# Patient Record
Sex: FEMALE | Race: WHITE
[De-identification: names, ages, dates, MRNs, and addresses within clinical notes are randomized per-mention and may not be internally consistent; named-entity substitution may affect disease eponyms.]

---

## 2017-05-29 ENCOUNTER — HOSPITAL ENCOUNTER (EMERGENCY)
Dept: HOSPITAL 45 - C.EDB | Age: 47
Discharge: HOME | End: 2017-05-29
Payer: COMMERCIAL

## 2017-05-29 VITALS — SYSTOLIC BLOOD PRESSURE: 131 MMHG | DIASTOLIC BLOOD PRESSURE: 70 MMHG | OXYGEN SATURATION: 96 % | HEART RATE: 91 BPM

## 2017-05-29 VITALS
WEIGHT: 207.9 LBS | HEIGHT: 67.99 IN | HEIGHT: 67.99 IN | BODY MASS INDEX: 31.51 KG/M2 | BODY MASS INDEX: 31.51 KG/M2 | WEIGHT: 207.9 LBS

## 2017-05-29 VITALS — TEMPERATURE: 98.06 F

## 2017-05-29 DIAGNOSIS — G89.4: ICD-10-CM

## 2017-05-29 DIAGNOSIS — K08.89: Primary | ICD-10-CM

## 2017-05-29 DIAGNOSIS — Z84.1: ICD-10-CM

## 2017-05-29 DIAGNOSIS — E78.5: ICD-10-CM

## 2017-05-29 DIAGNOSIS — Z82.49: ICD-10-CM

## 2017-05-29 DIAGNOSIS — F17.200: ICD-10-CM

## 2017-05-29 DIAGNOSIS — J33.9: ICD-10-CM

## 2017-05-29 DIAGNOSIS — Z98.51: ICD-10-CM

## 2017-05-29 DIAGNOSIS — K02.9: ICD-10-CM

## 2017-05-29 DIAGNOSIS — Z83.3: ICD-10-CM

## 2017-05-29 DIAGNOSIS — E11.9: ICD-10-CM

## 2017-05-29 NOTE — EMERGENCY ROOM VISIT NOTE
History


First contact with patient:  15:01


Chief Complaint:  HEADACHE


Stated Complaint:  SEVERE HEADACHE/JAW/EAR PAIN





History of Present Illness


The patient is a 47 year old female who presents to the Emergency Room with 

complaints of jaw pain intermittently over the past 6 months.  The patient 

states that she has had intermittent dental pain for the past 6 months.  She 

states that her teeth have been falling out.  She has seen a dentist and does 

have an appointment this week for a consultation to have her teeth extracted.  

The patient states that she has pain throughout her jaw which also gives her a 

headache and left ear pain.  She states she was seen a few weeks ago at a walk-

in clinic and referred here, but at that time she was in too much pain to come 

here.  She states the pain makes it difficult for her to eat.  She rates the 

discomfort a 7/10.  She has been taking Tylenol and ibuprofen without relief.  

She is a smoker.  She denies any fevers, chest pain, shortness of breath or 

neck pain/stiffness.





Review of Systems


A complete 10 point review of systems was reviewed with the patient with 

pertinent positives and negatives as per history of present illness. All else 

were negative.





Past Medical/Surgical History


Medical Problems:


(1) Chronic pain syndrome


(2) Diabetes mellitus type 2


(3) dyslipidemia


(4) endometrial cryoablation


(5) History of -  section


(6) History of - tubal ligation


(7) History of cholecystectomy


(8) Nasal polyp


(9) Sepsis


(10) Ulcer


Surgical Problems:


(1) Hx of cholecystectomy








Family History





Cancer


Diabetes mellitus


Gallbladder disease


Heart disease


Hypertension


Kidney stones





Social History


Smoking Status:  Current Every Day Smoker


Alcohol Use:  none


Marital Status:  single


Housing Status:  unknown


Occupation Status:  other





Current/Historical Medications


Scheduled


Amoxicillin (Amoxil), 500 MG PO BID


Gabapentin (Gabapentin), 600 MG PO TID


Methadone Hcl (Methadone Hcl), 144 MG PO QAM


Omeprazole (Prilosec), 20 MG PO BID


Venlafaxine Hcl (Effexor Extended Rel), 75 MG PO DAILY


Venlafaxine Hcl (Effexor Extended Rel), 150 MG PO DAILY





Scheduled PRN


Acetaminophen/Codeine (Tylenol W/Codeine #3), 1-2 TABS PO Q4H PRN for Pain


Clonazepam (Klonopin), 1 MG PO DAILY PRN for Anxiety


Docusate Sodium (Docusate Sodium), 100 MG PO BID PRN for Constipation





Allergies


Coded Allergies:  


     Tramadol (Unverified  Allergy, Unknown, SEIZURE, 16)


     Promethazine (Verified  Adverse Reaction, Mild, JITTERY, 16)


 PER PATIENT, GETS JITTERY. NOT ALLERGY





Physical Exam


Vital Signs











  Date Time  Temp Pulse Resp B/P Pulse Ox O2 Delivery O2 Flow Rate FiO2


 


17 16:15  91 18 131/70 96   


 


17 14:56 36.7 123 18 145/77 94 Room Air  











Physical Exam


VITALS: Vitals are noted on the nurse's note and reviewed by myself.  Vital 

signs stable.


GENERAL: This is a 47-year-old female, in no acute distress, nondiaphoretic, 

well-developed well-nourished.


SKIN: The skin was without rashes.


EARS: External auditory canals clear, tympanic membranes pearly gray without 

erythema or effusion bilaterally.


EYES: Pupils equal round and reactive to light and accommodation.  


NOSE: Patent, turbinates without inflammation or discharge.  No sinus 

tenderness.


MOUTH: Mucous membranes moist.  The teeth are very carious.  There is no 

obvious swelling or erythema of the gums.  There is no discharge or any sign of 

an abscess.


NECK: Supple without nuchal rigidity.  No lymphadenopathy. 


HEART: Regular rate and rhythm without murmurs gallops or rubs.


LUNGS: Clear to auscultation bilaterally without wheezes, rales or rhonchi.  


NEURO: Patient was alert and oriented to person place and time.





Medical Decision & Procedures


Medications Administered











 Medications


  (Trade)  Dose


 Ordered  Sig/Anibal


 Route  Start Time


 Stop Time Status Last Admin


Dose Admin


 


 Ketorolac


 Tromethamine


  (Toradol Inj)  60 mg  NOW  STAT


 IM  17 15:26


 17 15:28 DC 17 15:34


60 MG


 


 Acetaminophen/


 Codeine Phosphate


  (TYLENOL W/


 CODEINE #3 Home


 Pack)  1 homepack  UD  ONCE


 PO  17 15:30


 17 15:31 DC 17 15:34


1 HOMEPACK











Medical Decision


Differential diagnosis includes dental pain, dental infection, TMJ disorder, 

among others.





The patient was evaluated as above.  Her pain appears to be dental in nature.  

She does not appear to have an infection.  The patient was treated with an 

injection of Toradol here.  She will be given a short course of Tylenol with 

codeine, but she was instructed to follow-up with her dentist for any further 

treatment of her chronic dental pain.  This pain has been ongoing for 6 months 

and she was informed that the emergency department is not able to treat her 

chronic pain.  She verbalized understanding of my assessment and treatment plan 

and was discharged home in good condition.





Impression





 Primary Impression:  


 Chronic dental pain





Departure Information


Dispostion


Home / Self-Care





Condition


GOOD





Prescriptions





Acetaminophen/Codeine (Tylenol W/Codeine #3) 300 Mg/30 Mg Tab


1-2 TABS PO Q4H Y for Pain, #15 TAB


   For Initial Treatment


   Prov: Aye Jasmine ., MYRANDA         17





Referrals


No Doctor, Assigned (PCP)





Patient Instructions


My [facility]





Additional Instructions





You have been prescribed Tylenol with Codeine to be used for pain control.  

Take 1-2 tablets every 4-6 hours as needed for pain.   This is a narcotic 

medication. You cannot drive or consume alcohol while on this medicine. This 

medicine should only be used for pain that cannot be controlled with over-the-

counter pain medicines.





For pain control, you can use the following over-the-counter medicines (if >13 yo):





- Regular strength (325mg/tab) Tylenol (acetaminophen) 2 tabs every 4-6 hours 

as needed. Do not exceed 12 tablets in a 24 hour period. Avoid taking more than 

4 grams (4000 mg) of Tylenol per day. This includes any other sources of 

acetaminophen you may take on a regular basis.





- Regular strength (200 mg/tab) Advil (ibuprofen) 1-2 tabs every 4-6 hours as 

needed. Do not exceed a dose of 3200 mg per day.





Follow-up with your dentist as scheduled.





Return to the emergency department with high fevers, neck swelling or any other 

new/concerning symptoms.

## 2017-06-26 ENCOUNTER — HOSPITAL ENCOUNTER (EMERGENCY)
Dept: HOSPITAL 45 - C.EDB | Age: 47
LOS: 1 days | Discharge: HOME | End: 2017-06-27
Payer: COMMERCIAL

## 2017-06-26 VITALS
BODY MASS INDEX: 31.71 KG/M2 | HEIGHT: 67.99 IN | BODY MASS INDEX: 31.71 KG/M2 | HEIGHT: 67.99 IN | WEIGHT: 209.22 LBS | WEIGHT: 209.22 LBS

## 2017-06-26 VITALS — TEMPERATURE: 98.42 F

## 2017-06-26 DIAGNOSIS — F17.200: ICD-10-CM

## 2017-06-26 DIAGNOSIS — G89.4: ICD-10-CM

## 2017-06-26 DIAGNOSIS — Z83.3: ICD-10-CM

## 2017-06-26 DIAGNOSIS — Z98.51: ICD-10-CM

## 2017-06-26 DIAGNOSIS — E11.9: ICD-10-CM

## 2017-06-26 DIAGNOSIS — J18.9: ICD-10-CM

## 2017-06-26 DIAGNOSIS — Z82.49: ICD-10-CM

## 2017-06-26 DIAGNOSIS — R07.89: Primary | ICD-10-CM

## 2017-06-26 DIAGNOSIS — E78.5: ICD-10-CM

## 2017-06-26 DIAGNOSIS — Z84.1: ICD-10-CM

## 2017-06-26 LAB
ALBUMIN/GLOB SERPL: 0.9 {RATIO} (ref 0.9–2)
ALP SERPL-CCNC: 180 U/L (ref 45–117)
ALT SERPL-CCNC: 64 U/L (ref 12–78)
ANION GAP SERPL CALC-SCNC: 6 MMOL/L (ref 3–11)
APPEARANCE UR: CLEAR
AST SERPL-CCNC: 38 U/L (ref 15–37)
BASOPHILS # BLD: 0.05 K/UL (ref 0–0.2)
BASOPHILS NFR BLD: 0.5 %
BILIRUB UR-MCNC: (no result) MG/DL
BUN SERPL-MCNC: 10 MG/DL (ref 7–18)
BUN/CREAT SERPL: 10.8 (ref 10–20)
CALCIUM SERPL-MCNC: 9.4 MG/DL (ref 8.5–10.1)
CHLORIDE SERPL-SCNC: 104 MMOL/L (ref 98–107)
CO2 SERPL-SCNC: 27 MMOL/L (ref 21–32)
COLOR UR: YELLOW
COMPLETE: YES
CREAT CL PREDICTED SERPL C-G-VRATE: 92 ML/MIN
CREAT SERPL-MCNC: 0.92 MG/DL (ref 0.6–1.2)
EOSINOPHIL NFR BLD AUTO: 226 K/UL (ref 130–400)
GLOBULIN SER-MCNC: 4.1 GM/DL (ref 2.5–4)
GLUCOSE SERPL-MCNC: 81 MG/DL (ref 70–99)
HCT VFR BLD CALC: 39.4 % (ref 37–47)
IG%: 0.7 %
IMM GRANULOCYTES NFR BLD AUTO: 29.3 %
LYMPHOCYTES # BLD: 3.13 K/UL (ref 1.2–3.4)
MANUAL MICROSCOPIC REQUIRED?: NO
MCH RBC QN AUTO: 29.9 PG (ref 25–34)
MCHC RBC AUTO-ENTMCNC: 32.5 G/DL (ref 32–36)
MCV RBC AUTO: 92.1 FL (ref 80–100)
MONOCYTES NFR BLD: 5.1 %
NEUTROPHILS # BLD AUTO: 5.1 %
NEUTROPHILS NFR BLD AUTO: 59.3 %
NITRITE UR QL STRIP: (no result)
PH UR STRIP: 5 [PH] (ref 4.5–7.5)
PMV BLD AUTO: 9.5 FL (ref 7.4–10.4)
POTASSIUM SERPL-SCNC: 3.8 MMOL/L (ref 3.5–5.1)
RBC # BLD AUTO: 4.28 M/UL (ref 4.2–5.4)
REVIEW REQ?: NO
SODIUM SERPL-SCNC: 137 MMOL/L (ref 136–145)
SP GR UR STRIP: 1.02 (ref 1–1.03)
URINE BILL WITH OR WITHOUT MIC: (no result)
UROBILINOGEN UR-MCNC: (no result) MG/DL
WBC # BLD AUTO: 10.68 K/UL (ref 4.8–10.8)
ZZUR CULT IF INDIC CLEAN CATCH: NO

## 2017-06-26 NOTE — EMERGENCY ROOM VISIT NOTE
History


Report prepared by Lida:  Gwendolyn Gardner


Under the Supervision of:  LOVE BeckO.


First contact with patient:  18:02


Chief Complaint:  RIB PAIN


Stated Complaint:  R FRONT AND BACK RIB PAIN,FEVER,SOB





History of Present Illness


The patient is a 47 year old female who presents to the Emergency Room with 

complaints of persistent, worsening right sided rib pain to the anterior and 

posterior aspects that began several weeks ago.  She currently rates her 

discomfort as a 6/10 in severity.  The patient states that she developed the 

pain a few weeks ago, but states that the pain has worsened.  She states that 

she has difficulty holding her grandson due to the pain.  The patient describes 

the pain as a pulsating pain and additionally associates shortness of breath.  

She reports a history of pneumonia and states that she is an every day smoker.  

The patient states that she is scheduled for mouth surgery this month for an 

extensive infection.  She reports a fever of 101-102 degrees Fahrenheit over 

the past several days.  The patient states that her symptoms feel worse than 

her pneumonia.  She additionally notes multiple sick contacts.  The patient 

denies any change in bowel movements or urinary symptoms.  She denies any 

recent fall, trauma, or injury.  The patient denies any recent travel.  She 

denies any history of hypertension, kidney disease, or heart disease.





   Source of History:  patient


   Onset:  several weeks ago


   Position:  other (right sided rib)


   Symptom Intensity:  6/10


   Quality:  other (pulsating)


   Timing:  worsening, other (persistent)


   Associated Symptoms:  + fevers, + SOB, No urinary symptoms





Review of Systems


See HPI for pertinent positives & negatives. A total of 10 systems reviewed and 

were otherwise negative.





Past Medical & Surgical


Medical Problems:


(1) Chronic pain syndrome


(2) Diabetes mellitus type 2


(3) dyslipidemia


(4) endometrial cryoablation


(5) History of -  section


(6) History of - tubal ligation


(7) History of cholecystectomy


(8) Nasal polyp


(9) Sepsis


(10) Ulcer


Surgical Problems:


(1) Hx of cholecystectomy








Family History





Cancer


Diabetes mellitus


Gallbladder disease


Heart disease


Hypertension


Kidney stones





Social History


Smoking Status:  Current Every Day Smoker


Alcohol Use:  none


Marital Status:  single


Housing Status:  unknown


Occupation Status:  other





Current/Historical Medications


Scheduled


Docusate Sodium (Docusate Sodium), 100 MG PO DAILY


Gabapentin (Gabapentin), 600 MG PO TID


Levofloxacin (Levaquin), 750 MG PO DAILY


Methadone Hcl (Methadone Hcl), 144 MG PO QAM


Omeprazole (Prilosec), 20 MG PO BID


Prednisone (Prednisone Tab), 3 TAB PO DAILY


Venlafaxine Hcl (Effexor Extended Rel), 75 MG PO DAILY


Venlafaxine Hcl (Effexor Extended Rel), 150 MG PO DAILY





Scheduled PRN


Clonazepam (Klonopin), 1 MG PO DAILY PRN for Anxiety





Allergies


Coded Allergies:  


     Tramadol (Unverified  Allergy, Unknown, SEIZURE, 16)


     Promethazine (Verified  Adverse Reaction, Mild, JITTERY, 16)


 PER PATIENT, GETS JITTERY. NOT ALLERGY





Physical Exam


Vital Signs











  Date Time  Temp Pulse Resp B/P (MAP) Pulse Ox O2 Delivery O2 Flow Rate FiO2


 


17 01:07  79 18 109/65 97 Room Air  


 


17 01:06  79 20  97 Room Air  


 


17 00:13  78 18 94/62 93 Room Air  


 


17 22:49  81 20 108/55 93 Room Air  


 


17 20:58  73 20 121/58 98 Room Air  


 


17 19:39  84 20 133/70 94 Room Air  


 


17 17:59 36.9 93 16 135/82 95 Room Air  











Physical Exam


GENERAL: alert, well appearing, well nourished, no distress, non-toxic 


EYE EXAM: normal conjunctiva, PERRL and EOM's grossly intact


OROPHARYNX: no exudate, no erythema, lips, buccal mucosa, and tongue normal and 

mucous membranes are dry, poor dentition.


NECK: supple, no nuchal rigidity, no adenopathy, non-tender


LUNGS: Decreased breath sounds at the right base posteriorly, slight crackle, 

no wheezes, rhonchi, or rales. Normal chest wall mechanics


HEART: no murmurs, S1 normal and S2 normal 


ABDOMEN: abdomen soft, non-tender, normo-active bowel sounds, no masses, no 

rebound or guarding. 


BACK: Back is symmetrical on inspection and there is no deformity, no midline 

tenderness, no CVA tenderness. 


SKIN: no rashes and no bruising 


UPPER EXTREMITIES: upper extremities are grossly normal. 


LOWER EXTREMITIES: No pitting edema.


NEURO EXAM: Normal sensorium, cranial nerves II-XII [grossly] intact, normal 

speech,  no [gross] weakness of arms, no [gross] weakness of legs. [No drift. 

Finger to nose intact. Gross sensation intact.]





Medical Decision & Procedures


ER Provider


Diagnostic Interpretation:


Radiology results have been interpreted by the radiologist and reviewed by me.





CHEST 2 VIEWS ROUTINE





HISTORY:      Short of breath. Cough.





COMPARISON: Chest 2016.





FINDINGS: The lungs are clear. Cardiac silhouette is normal in size. No pleural


effusions. No pneumothorax. Cholecystectomy. Old distal left clavicle fracture.





IMPRESSION:


No acute process.





Electronically signed by:  Dandy Chiang M.D.


2017 7:25 PM





Dictated Date/Time:  2017 7:23 PM





ABDOMINAL ULTRASOUND, RIGHT UPPER QUADRANT





HISTORY:      Right upper quadrant abdominal pain..





COMPARISON:  Abdomen and pelvis CT 2015.





FINDINGS:





Pancreas: The head of the pancreas appears unremarkable. The majority of the


body and tail are obscured by overlying bowel gas.





Liver: The liver is echogenic consistent with fatty change. Mild intrahepatic


bile duct dilatation, unchanged.





Gallbladder: The gallbladder is surgically absent.





CBD: 1.4 cm in diameter. This remains unchanged.





Right kidney: No hydronephrosis.





IMPRESSION: 





1. Intra and extra hepatic bile duct dilatation, unchanged. This may be due to


the patient's postcholecystectomy state.


2. Hepatic steatosis.





Electronically signed by:  Dandy Chiang M.D.


2017 9:37 PM





Dictated Date/Time:  2017 9:34 PM





CTA CHEST: 


No central pulmonary embolus.





Patchy ground-glass opacities bilaterally that may be from edema, pneumonia, 

hemorrhage or allergic reaction.  There is a broader differential.





Mediastinal and hilar adenopathy.


Generous size liver and spleen.  Slightly nodular liver.


Cholecystectomy and biliary ectasia.





Radiologist: Manal Schoellerman MD         Phone: [phone]





Study ready at 6861 and initial results transmitted at 4171





Laboratory Results


17 18:30








Red Blood Count 4.28, Mean Corpuscular Volume 92.1, Mean Corpuscular Hemoglobin 

29.9, Mean Corpuscular Hemoglobin Concent 32.5, Mean Platelet Volume 9.5, 

Neutrophils (%) (Auto) 59.3, Lymphocytes (%) (Auto) 29.3, Monocytes (%) (Auto) 

5.1, Eosinophils (%) (Auto) 5.1, Basophils (%) (Auto) 0.5, Neutrophils # (Auto) 

6.35, Lymphocytes # (Auto) 3.13, Monocytes # (Auto) 0.54, Eosinophils # (Auto) 

0.54, Basophils # (Auto) 0.05





17 18:30

















Test


  17


18:30 17


21:10


 


White Blood Count


  10.68 K/uL


(4.8-10.8) 


 


 


Red Blood Count


  4.28 M/uL


(4.2-5.4) 


 


 


Hemoglobin


  12.8 g/dL


(12.0-16.0) 


 


 


Hematocrit 39.4 % (37-47)  


 


Mean Corpuscular Volume


  92.1 fL


() 


 


 


Mean Corpuscular Hemoglobin


  29.9 pg


(25-34) 


 


 


Mean Corpuscular Hemoglobin


Concent 32.5 g/dl


(32-36) 


 


 


Platelet Count


  226 K/uL


(130-400) 


 


 


Mean Platelet Volume


  9.5 fL


(7.4-10.4) 


 


 


Neutrophils (%) (Auto) 59.3 %  


 


Lymphocytes (%) (Auto) 29.3 %  


 


Monocytes (%) (Auto) 5.1 %  


 


Eosinophils (%) (Auto) 5.1 %  


 


Basophils (%) (Auto) 0.5 %  


 


Neutrophils # (Auto)


  6.35 K/uL


(1.4-6.5) 


 


 


Lymphocytes # (Auto)


  3.13 K/uL


(1.2-3.4) 


 


 


Monocytes # (Auto)


  0.54 K/uL


(0.11-0.59) 


 


 


Eosinophils # (Auto)


  0.54 K/uL


(0-0.5) 


 


 


Basophils # (Auto)


  0.05 K/uL


(0-0.2) 


 


 


RDW Standard Deviation


  49.8 fL


(36.4-46.3) 


 


 


RDW Coefficient of Variation


  14.9 %


(11.5-14.5) 


 


 


Immature Granulocyte % (Auto) 0.7 %  


 


Immature Granulocyte # (Auto)


  0.07 K/uL


(0.00-0.02) 


 


 


D-Dimer


  200 ug/L FEU


(0-500) 


 


 


Anion Gap


  6.0 mmol/L


(3-11) 


 


 


Est Creatinine Clear Calc


Drug Dose 92.0 ml/min 


  


 


 


Estimated GFR (


American) 86.5 


  


 


 


Estimated GFR (Non-


American 74.7 


  


 


 


BUN/Creatinine Ratio 10.8 (10-20)  


 


Calcium Level


  9.4 mg/dl


(8.5-10.1) 


 


 


Total Bilirubin


  0.2 mg/dl


(0.2-1) 


 


 


Aspartate Amino Transf


(AST/SGOT) 38 U/L (15-37) 


  


 


 


Alanine Aminotransferase


(ALT/SGPT) 64 U/L (12-78) 


  


 


 


Alkaline Phosphatase


  180 U/L


() 


 


 


Troponin I


  < 0.015 ng/ml


(0-0.045) 


 


 


Total Protein


  7.9 gm/dl


(6.4-8.2) 


 


 


Albumin


  3.8 gm/dl


(3.4-5.0) 


 


 


Globulin


  4.1 gm/dl


(2.5-4.0) 


 


 


Albumin/Globulin Ratio 0.9 (0.9-2)  


 


Urine Color  YELLOW 


 


Urine Appearance  CLEAR (CLEAR) 


 


Urine pH  5.0 (4.5-7.5) 


 


Urine Specific Gravity


  


  1.016


(1.000-1.030)


 


Urine Protein  NEG (NEG) 


 


Urine Glucose (UA)  NEG (NEG) 


 


Urine Ketones  NEG (NEG) 


 


Urine Occult Blood  NEG (NEG) 


 


Urine Nitrite  NEG (NEG) 


 


Urine Bilirubin  NEG (NEG) 


 


Urine Urobilinogen  NEG (NEG) 


 


Urine Leukocyte Esterase  NEG (NEG) 








Laboratory results per my review.





Medications Administered











 Medications


  (Trade)  Dose


 Ordered  Sig/Anibal


 Route  Start Time


 Stop Time Status Last Admin


Dose Admin


 


 Albuterol/


 Ipratropium


  (Duoneb)  3 ml  NOW  STAT


 INH  17 18:39


 17 18:40 DC 17 18:39


3 ML


 


 Ketorolac


 Tromethamine


  (Toradol Inj)  30 mg  NOW  STAT


 IV  17 19:55


 17 19:56 DC 17 20:04


30 MG


 


 Albuterol/


 Ipratropium


  (Duoneb)  3 ml  NOW  STAT


 INH  17 21:31


 17 21:32 DC 17 21:31


3 ML


 


 Al Hydroxide/Mg


 Hydroxide


  (Maalox Susp)  30 ml  NOW  STAT


 PO  17 22:28


 17 22:30 DC 17 22:28


30 ML


 


 Pantoprazole


 Sodium 40 mg/


 Syringe  10 ml @ 5


 mls/min  NOW  ONCE


 IV  17 22:30


 17 22:31 DC 17 22:48


5 MLS/MIN


 


 Dexamethasone


 Sodium Phosphate


  (Decadron Inj)  10 mg  NOW  ONCE


 IV  17 23:45


 17 23:47 DC 17 00:09


10 MG


 


 Levofloxacin


  (Levaquin Tab)  750 mg  NOW  STAT


 PO  17 23:45


 17 23:47 DC 17 00:08


750 MG


 


 Albuterol


  (Ventolin Hfa


 Inhaler)  2 puffs  NOW  ONCE


 INH  17 00:30


 17 00:31 DC 17 00:42


2 PUFFS


 


 Sodium Chloride  1,000 ml @ 


 999 mls/hr  Q1H1M STAT


 IV  17 00:32


 17 01:32 DC 17 00:41


999 MLS/HR











ECG


Indication:  SOB/dyspnea


Rate (beats per minute):  71


Rhythm:  sinus rhythm


Findings:  no acute ischemic change, other (normal axis, normal intervals)





ED Course


: The patient was evaluated in room A3. A complete history and physical 

exam was performed. 





: Ordered Duoneb 3 ml INH.





: Ordered Toradol Inj 30 mg IV.





: Ordered Duoneb 3 ml INH.





2225: I reevaluated the patient and she is still having pain. She states that 

her breathing is better, but still notes pain.





8: Ordered Maalox Susp 30 ml PO.





2230: Ordered Pantoprazole Sodium 40 mg/Syringe 10 ml @ 5 mls/min IV.





2345: Ordered Levofloxacin 750 mg PO, Decadron Inj 10 mg IV.





0010: I reevaluated the patient and she is resting.  I updated her on her 

results at this time.





Medical Decision


Differential diagnoses includes but is not limited to pneumonia, bronchitis, 

COPD/Asthma exacerbation, pneumothorax, pulmonary embolism, congestive heart 

failure, acute coronary syndrome





Medication Reconciliation: I attest that I have personally reviewed the patient'

s current medication list.





Blood pressure screening: Patient was found to have normal blood pressure on 

screening and does not require follow-up.





Patient well-appearing despite complaints, no hypoxia, no increased work of 

breathing, her main complaint was the pain along the right anterolateral ribs.  

Labs are reassuring, however given persistent pain CT of the chest was pursued.

  This revealed likely pneumonia.  Patient does have a history of prior 

pneumonia, does continue to smoke, and has had recent sick contacts.  Given the 

cough, chest pain, fevers, and risk factors for pulmonary infection, I feel 

pneumonia is most likely diagnosis.  Patient started on antibiotics and 

steroids here, was given an MDI and spacer and did improve.  Patient ambulated 

without any significant change in her pulse ox or significant increased work of 

breathing.   was helpful in arranging outpatient follow-up this 

patient has no PCP.  Discussed with patient symptoms to watch and return for, 

she verbalized understanding was agreeable with plan.  Doubt cardiac etiology, 

PE, concurrent vascular etiology.  Patient encouraged to quit smoking.  No 

evidence of bacteremia/sepsis, patient's IV did infiltrate and she refused to 

have another one established, however she was tolerating by mouth fluids.  One 

blood pressure reading was mildly low, however all of these around it were 

within normal limits, I feel this one pressure reading was errant and likely 

not accurate.





Impression





 Primary Impression:  


 Chest pain


 Additional Impressions:  


 Pneumonia


 Tobacco abuse





Scribe Attestation


The scribe's documentation has been prepared under my direction and personally 

reviewed by me in its entirety. I confirm that the note above accurately 

reflects all work, treatment, procedures, and medical decision making performed 

by me.





Departure Information


Dispostion


Home / Self-Care





Prescriptions





Prednisone (Prednisone Tab) 20 Mg Tab


3 TAB PO DAILY, #12 TAB


   FOR 4 DAYS


   Prov: Marlene Green,          17 


Levofloxacin (Levaquin) 500 Mg Tab


750 MG PO DAILY for 5 Days, #8 TAB


   Prov: Marlene Green, DO         17





Referrals


No Doctor, Assigned (PCP)





Patient Instructions


My [facility]





Additional Instructions





Please call and follow up as scheduled with the  with family doctor 

as an outpatient.  Please take the antibiotics and steroids as prescribed.  You 

may use the inhaler plus spacer up to every 4 hours as needed for frequent 

coughing, shortness of breath, or wheezing.  You may use Tylenol and ibuprofen 

as needed for pain.  If you have any worsening pain, develop increased trouble 

breathing, or coughing up blood, fevers, dizziness, vomiting, or you've any 

other new concerns, please return the emergency room.





Problem Qualifiers








 Primary Impression:  


 Chest pain


 Chest pain type:  other chest pain  Qualified Codes:  R07.89 - Other chest pain


 Additional Impressions:  


 Pneumonia


 Pneumonia type:  due to unspecified organism  Laterality:  bilateral  Lung 

location:  lower lobe of lung  Qualified Codes:  J18.9 - Pneumonia, unspecified 

organism

## 2017-06-26 NOTE — DIAGNOSTIC IMAGING REPORT
CHEST 2 VIEWS ROUTINE



HISTORY:      Short of breath. Cough.



COMPARISON: Chest 12/12/2016.



FINDINGS: The lungs are clear. Cardiac silhouette is normal in size. No pleural

effusions. No pneumothorax. Cholecystectomy. Old distal left clavicle fracture.



IMPRESSION:

No acute process.







Electronically signed by:  Dandy Chiang M.D.

6/26/2017 7:25 PM



Dictated Date/Time:  6/26/2017 7:23 PM

## 2017-06-26 NOTE — DIAGNOSTIC IMAGING REPORT
ABDOMINAL ULTRASOUND, RIGHT UPPER QUADRANT



HISTORY:      Right upper quadrant abdominal pain..



COMPARISON:  Abdomen and pelvis CT 9/2/2015.



FINDINGS:



Pancreas: The head of the pancreas appears unremarkable. The majority of the

body and tail are obscured by overlying bowel gas.



Liver: The liver is echogenic consistent with fatty change. Mild intrahepatic

bile duct dilatation, unchanged.



Gallbladder: The gallbladder is surgically absent.



CBD: 1.4 cm in diameter. This remains unchanged.



Right kidney: No hydronephrosis.



IMPRESSION: 



1. Intra and extra hepatic bile duct dilatation, unchanged. This may be due to

the patient's postcholecystectomy state.

2. Hepatic steatosis.







Electronically signed by:  Dandy Chiang M.D.

6/26/2017 9:37 PM



Dictated Date/Time:  6/26/2017 9:34 PM

## 2017-06-27 VITALS — HEART RATE: 79 BPM | DIASTOLIC BLOOD PRESSURE: 65 MMHG | SYSTOLIC BLOOD PRESSURE: 109 MMHG | OXYGEN SATURATION: 97 %

## 2017-06-27 NOTE — DIAGNOSTIC IMAGING REPORT
CT ANGIOGRAM OF THE CHEST



CLINICAL HISTORY: Atypical chest pain. Dyspnea.



COMPARISON STUDY:  Chest x-ray dated 6/26/2017. Chest CT scans dated to/4/16 and

12/9/2008.



TECHNIQUE: Following the IV administration of 88 cc of Optiray 320, CT angiogram

of the chest was performed from the upper abdomen to the thoracic inlet

utilizing the pulmonary embolus protocol. Images are reviewed in the axial,

sagittal, and coronal planes. 3-D MIPS images are created and assessed. IV

contrast was administered without complication.



CT DOSE: 416.54 mGy.cm



FINDINGS:



Thyroid: Imaged portions of the thyroid gland are normal in size and

attenuation.



Thoracic aorta: The thoracic aorta is normal in caliber and demonstrates

standard 3-vessel arch anatomy. No dissection is seen.



Pulmonary vasculature: The pulmonary trunk is normal in caliber. There are no

filling defects identified in main, lobar, or segmental pulmonary branches to

suggest pulmonary embolus.



Heart: The heart is normal in size and configuration, and without pericardial

effusion.



Lungs and pleural spaces: There are trace pleural effusions with associated

atelectasis. There is multifocal patchy groundglass consolidation, greatest in

the upper lobes. This is similar to the 2/4/2016 examination. The trachea and

central airways are clear



Mediastinum: There is mild mediastinal lymphadenopathy. Prevascular nodes

measure up to 1.2 cm in short axis.



Shama: Prominent hilar nodes measure up to 11 mm in short axis.



Axillae: There is no axillary lymphadenopathy.



Upper abdomen: There is a tiny hiatal hernia. Cholecystectomy clips are noted.

The liver is enlarged and steatotic. Mild nodularity of the surface contour is

questioned. The spleen is enlarged measuring 14.6 cm in length.



Skeletal structures: No lytic or blastic bony lesions are seen. A large

hemangioma is noted in the body of T11.





IMPRESSION:



1. There is no evidence of pulmonary embolus in the main, lobar, or segmental

pulmonary arteries.



2. There is multifocal groundglass consolidation within upper lobe predominance.

This likely represents an infectious/inflammatory pneumonitis and is similar in

appearance to the 2/4/2016 examination. Differential considerations include

hemorrhage, edema, or a hypersensitivity reaction. Pulmonology follow-up is

recommended.



3. Mildly enlarged mediastinal and hilar lymph nodes are likely on a reactive

basis.



4. Hepatomegaly and hepatic steatosis.



5. Splenomegaly.



6. Trace pleural effusions.







Electronically signed by:  Darshan Montgomery M.D.

6/27/2017 7:50 AM



Dictated Date/Time:  6/27/2017 7:37 AM

## 2017-08-02 ENCOUNTER — HOSPITAL ENCOUNTER (EMERGENCY)
Dept: HOSPITAL 45 - C.EDB | Age: 47
LOS: 1 days | Discharge: HOME | End: 2017-08-03
Payer: COMMERCIAL

## 2017-08-02 VITALS
WEIGHT: 211.86 LBS | BODY MASS INDEX: 32.11 KG/M2 | HEIGHT: 67.99 IN | BODY MASS INDEX: 32.11 KG/M2 | WEIGHT: 211.86 LBS | HEIGHT: 67.99 IN

## 2017-08-02 DIAGNOSIS — G89.4: ICD-10-CM

## 2017-08-02 DIAGNOSIS — Z98.51: ICD-10-CM

## 2017-08-02 DIAGNOSIS — E78.5: ICD-10-CM

## 2017-08-02 DIAGNOSIS — J20.9: Primary | ICD-10-CM

## 2017-08-02 DIAGNOSIS — F17.200: ICD-10-CM

## 2017-08-02 DIAGNOSIS — Z82.49: ICD-10-CM

## 2017-08-02 DIAGNOSIS — Z84.1: ICD-10-CM

## 2017-08-02 DIAGNOSIS — Z83.3: ICD-10-CM

## 2017-08-02 DIAGNOSIS — E11.9: ICD-10-CM

## 2017-08-02 LAB
ALBUMIN/GLOB SERPL: 0.8 {RATIO} (ref 0.9–2)
ALP SERPL-CCNC: 135 U/L (ref 45–117)
ALT SERPL-CCNC: 27 U/L (ref 12–78)
ANION GAP SERPL CALC-SCNC: 7 MMOL/L (ref 3–11)
APPEARANCE UR: CLEAR
AST SERPL-CCNC: 26 U/L (ref 15–37)
BASOPHILS # BLD: 0.02 K/UL (ref 0–0.2)
BASOPHILS NFR BLD: 0.2 %
BILIRUB UR-MCNC: (no result) MG/DL
BUN SERPL-MCNC: 7 MG/DL (ref 7–18)
BUN/CREAT SERPL: 9.6 (ref 10–20)
CALCIUM SERPL-MCNC: 8.8 MG/DL (ref 8.5–10.1)
CHLORIDE SERPL-SCNC: 105 MMOL/L (ref 98–107)
CO2 SERPL-SCNC: 25 MMOL/L (ref 21–32)
COLOR UR: YELLOW
COMPLETE: YES
CREAT CL PREDICTED SERPL C-G-VRATE: 115.1 ML/MIN
CREAT SERPL-MCNC: 0.74 MG/DL (ref 0.6–1.2)
EOSINOPHIL NFR BLD AUTO: 179 K/UL (ref 130–400)
GLOBULIN SER-MCNC: 3.9 GM/DL (ref 2.5–4)
GLUCOSE SERPL-MCNC: 118 MG/DL (ref 70–99)
HCT VFR BLD CALC: 40.1 % (ref 37–47)
IG%: 0.6 %
IMM GRANULOCYTES NFR BLD AUTO: 18.8 %
LYMPHOCYTES # BLD: 1.6 K/UL (ref 1.2–3.4)
MAGNESIUM SERPL-MCNC: 1.8 MG/DL (ref 1.8–2.4)
MANUAL MICROSCOPIC REQUIRED?: NO
MCH RBC QN AUTO: 28.6 PG (ref 25–34)
MCHC RBC AUTO-ENTMCNC: 31.4 G/DL (ref 32–36)
MCV RBC AUTO: 90.9 FL (ref 80–100)
MONOCYTES NFR BLD: 4.8 %
NEUTROPHILS # BLD AUTO: 1.1 %
NEUTROPHILS NFR BLD AUTO: 74.5 %
NITRITE UR QL STRIP: (no result)
PH UR STRIP: 5.5 [PH] (ref 4.5–7.5)
PMV BLD AUTO: 9.7 FL (ref 7.4–10.4)
POTASSIUM SERPL-SCNC: 3.6 MMOL/L (ref 3.5–5.1)
RBC # BLD AUTO: 4.41 M/UL (ref 4.2–5.4)
REVIEW REQ?: NO
SODIUM SERPL-SCNC: 137 MMOL/L (ref 136–145)
SP GR UR STRIP: 1.02 (ref 1–1.03)
URINE BILL WITH OR WITHOUT MIC: (no result)
URINE EPITHELIAL CELL AUTO: (no result) /LPF (ref 0–5)
UROBILINOGEN UR-MCNC: (no result) MG/DL
WBC # BLD AUTO: 8.52 K/UL (ref 4.8–10.8)
ZZUR CULT IF INDIC CLEAN CATCH: NO

## 2017-08-02 NOTE — EMERGENCY ROOM VISIT NOTE
History


First contact with patient:  22:14


Chief Complaint:  FEVER


Stated Complaint:  HIGH TEMP, SEVERE HEADACHE,CHEST PAIN





History of Present Illness


The patient is a 47 year old female who presents to the Emergency Room with 

complaints of fever, headaches, body aches, chills, chest pain and cough.  The 

patient states that 67 weeks ago, she was admitted due to pneumonia.  She 

states that she feels she was not fully treated.  She reports her symptoms have 

been worsening over the past few days.  She states pain all over her body, 

especially in her back.  She states the pain is primarily in her low back.  She 

does report she has had episodes of both bowel and urinary incontinence.  She 

states that she has a hard time taking a deep breath and has discomfort in her 

chest.  She reports some associated nausea but no vomiting.  She states she has 

not been taking any medications at home for her symptoms.  She reports she had 

all of her teeth removed 12 days ago.  She denies any drug use.  She rates her 

overall discomfort 7/10.  The patient denies any significant past medical 

history.  She denies abdominal pain, neck pain/stiffness, diarrhea, or urinary 

symptoms.





Review of Systems


A complete 10 point review of systems was reviewed with the patient with 

pertinent positives and negatives as per history of present illness. All else 

were negative.





Past Medical/Surgical History


Medical Problems:


(1) Chronic pain syndrome


(2) Diabetes mellitus type 2


(3) dyslipidemia


(4) endometrial cryoablation


(5) History of -  section


(6) History of - tubal ligation


(7) History of cholecystectomy


(8) Nasal polyp


(9) Sepsis


(10) Ulcer


Surgical Problems:


(1) Hx of cholecystectomy








Family History





Cancer


Diabetes mellitus


Gallbladder disease


Heart disease


Hypertension


Kidney stones





Social History


Smoking Status:  Current Every Day Smoker


Alcohol Use:  none


Marital Status:  single


Housing Status:  unknown


Occupation Status:  other





Current/Historical Medications


Scheduled


Amoxicillin & Pot Clavulanate (Augmentin 875-125 mg), 1 TAB PO BID


Amphetamine-Dextroamphetamine 30MG (Adderall 30MG), 30 MG PO BID


Docusate Sodium (Docusate Sodium), 100 MG PO DAILY


Doxepin (Sinequan), 10 MG PO DAILY


Doxepin (Sinequan), 25 MG PO DAILY


Gabapentin (Gabapentin), 600 MG PO TID


Methadone Hcl (Methadone Hcl), 144 MG PO QAM


Omeprazole (Prilosec), 20 MG PO BID


Propranolol (Inderal), 10 MG PO DAILY


Venlafaxine Hcl (Effexor Extended Rel), 75 MG PO DAILY


Venlafaxine Hcl (Effexor Extended Rel), 150 MG PO DAILY





Scheduled PRN


Clonazepam (Klonopin), 1 MG PO DAILY PRN for Anxiety


Hydrocodone/Acetaminophen 5MG/325MG (Norco 5MG/325MG), 1 TABLET PO AS DIRECTED 

PRN for Pain





Physical Exam


Vital Signs











  Date Time  Temp Pulse Resp B/P (MAP) Pulse Ox O2 Delivery O2 Flow Rate FiO2


 


8/3/17 02:32  84 18 125/67 96   


 


8/3/17 01:32  84      


 


8/3/17 01:09 37.4 87 18 112/73 95 Room Air  


 


17 23:12 37.9 104 18 106/59 97 Room Air  


 


17 21:21 37.5 134 16 138/74 93 Room Air  











Physical Exam


VITALS: Vitals are noted on the nurse's note and reviewed by myself.  Vital 

signs stable.


GENERAL: This is a 47-year-old female, curled up in bed, tearful, well-

developed well-nourished.


SKIN: The skin was without rashes.  


EARS: External auditory canals clear, tympanic membranes pearly gray without 

erythema or effusion bilaterally.


EYES: Pupils equal round and reactive to light and accommodation.  Conjunctivae 

without injection, sclerae without icterus.  Extraocular movements intact.  


NOSE: Patent, turbinates without inflammation or discharge.  


MOUTH: Mucous membranes moist.  Tonsils are not enlarged.  Pharynx without 

erythema or exudate. 


NECK: Supple without nuchal rigidity.  No lymphadenopathy.  No meningismus.  


HEART: Regular rate and rhythm without murmurs gallops or rubs.


LUNGS: Clear to auscultation bilaterally without wheezes, rales or rhonchi.  


ABDOMEN: Soft, nontender to palpation.


MUSCULOSKELETAL: There is tenderness to palpation across the lumbar spine.


NEURO: Patient was alert and oriented to person place and time.





Medical Decision & Procedures


ER Provider


Diagnostic Interpretation:


CHEST ONE VIEW PORTABLE





FINDINGS: Probable early bilateral parenchymal perihilar infiltrative change.


Diaphragms smooth. Costophrenic angles sharp. 





IMPRESSION:  Developing perihilar parenchymal infiltrates bilaterally. 








CT L SPINE:





No acute fracture or malalignment.  No destructive changes.


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





Radiologist:  Manal Schoellerman, MD





Laboratory Results


17 22:43








Red Blood Count 4.41, Mean Corpuscular Volume 90.9, Mean Corpuscular Hemoglobin 

28.6, Mean Corpuscular Hemoglobin Concent 31.4, Mean Platelet Volume 9.7, 

Neutrophils (%) (Auto) 74.5, Lymphocytes (%) (Auto) 18.8, Monocytes (%) (Auto) 

4.8, Eosinophils (%) (Auto) 1.1, Basophils (%) (Auto) 0.2, Neutrophils # (Auto) 

6.35, Lymphocytes # (Auto) 1.60, Monocytes # (Auto) 0.41, Eosinophils # (Auto) 

0.09, Basophils # (Auto) 0.02





17 22:43

















Test


  17


21:41 17


22:43 17


23:02


 


Urine Color YELLOW   


 


Urine Appearance CLEAR (CLEAR)   


 


Urine pH 5.5 (4.5-7.5)   


 


Urine Specific Gravity


  1.016


(1.000-1.030) 


  


 


 


Urine Protein NEG (NEG)   


 


Urine Glucose (UA) NEG (NEG)   


 


Urine Ketones NEG (NEG)   


 


Urine Occult Blood 1+ (NEG)   


 


Urine Nitrite NEG (NEG)   


 


Urine Bilirubin NEG (NEG)   


 


Urine Urobilinogen NEG (NEG)   


 


Urine Leukocyte Esterase NEG (NEG)   


 


Urine WBC (Auto) 1-5 /hpf (0-5)   


 


Urine RBC (Auto) 0-4 /hpf (0-4)   


 


Urine Hyaline Casts (Auto) 0 /lpf (0-5)   


 


Urine Epithelial Cells (Auto)


  20-30 /lpf


(0-5) 


  


 


 


Urine Bacteria (Auto) NEG (NEG)   


 


White Blood Count


  


  8.52 K/uL


(4.8-10.8) 


 


 


Red Blood Count


  


  4.41 M/uL


(4.2-5.4) 


 


 


Hemoglobin


  


  12.6 g/dL


(12.0-16.0) 


 


 


Hematocrit  40.1 % (37-47)  


 


Mean Corpuscular Volume


  


  90.9 fL


() 


 


 


Mean Corpuscular Hemoglobin


  


  28.6 pg


(25-34) 


 


 


Mean Corpuscular Hemoglobin


Concent 


  31.4 g/dl


(32-36) 


 


 


Platelet Count


  


  179 K/uL


(130-400) 


 


 


Mean Platelet Volume


  


  9.7 fL


(7.4-10.4) 


 


 


Neutrophils (%) (Auto)  74.5 %  


 


Lymphocytes (%) (Auto)  18.8 %  


 


Monocytes (%) (Auto)  4.8 %  


 


Eosinophils (%) (Auto)  1.1 %  


 


Basophils (%) (Auto)  0.2 %  


 


Neutrophils # (Auto)


  


  6.35 K/uL


(1.4-6.5) 


 


 


Lymphocytes # (Auto)


  


  1.60 K/uL


(1.2-3.4) 


 


 


Monocytes # (Auto)


  


  0.41 K/uL


(0.11-0.59) 


 


 


Eosinophils # (Auto)


  


  0.09 K/uL


(0-0.5) 


 


 


Basophils # (Auto)


  


  0.02 K/uL


(0-0.2) 


 


 


RDW Standard Deviation


  


  46.0 fL


(36.4-46.3) 


 


 


RDW Coefficient of Variation


  


  14.0 %


(11.5-14.5) 


 


 


Immature Granulocyte % (Auto)  0.6 %  


 


Immature Granulocyte # (Auto)


  


  0.05 K/uL


(0.00-0.02) 


 


 


Anion Gap


  


  7.0 mmol/L


(3-11) 


 


 


Est Creatinine Clear Calc


Drug Dose 


  115.1 ml/min 


  


 


 


Estimated GFR (


American) 


  112.6 


  


 


 


Estimated GFR (Non-


American 


  97.2 


  


 


 


BUN/Creatinine Ratio  9.6 (10-20)  


 


Calcium Level


  


  8.8 mg/dl


(8.5-10.1) 


 


 


Magnesium Level


  


  1.8 mg/dl


(1.8-2.4) 


 


 


Total Bilirubin


  


  0.2 mg/dl


(0.2-1) 


 


 


Aspartate Amino Transf


(AST/SGOT) 


  26 U/L (15-37) 


  


 


 


Alanine Aminotransferase


(ALT/SGPT) 


  27 U/L (12-78) 


  


 


 


Alkaline Phosphatase


  


  135 U/L


() 


 


 


Total Protein


  


  6.9 gm/dl


(6.4-8.2) 


 


 


Albumin


  


  3.0 gm/dl


(3.4-5.0) 


 


 


Globulin


  


  3.9 gm/dl


(2.5-4.0) 


 


 


Albumin/Globulin Ratio  0.8 (0.9-2)  


 


Bedside Lactic Acid Venous


  


  


  1.18 mmol/L


(0.90-1.70)











Medications Administered











 Medications


  (Trade)  Dose


 Ordered  Sig/Anibal


 Route  Start Time


 Stop Time Status Last Admin


Dose Admin


 


 Sodium Chloride  1,000 ml @ 


 999 mls/hr  Q1H1M STAT


 IV  17 22:26


 17 23:26 DC 17 23:09


999 MLS/HR


 


 Sodium Chloride  1,000 ml @ 


 999 mls/hr  Q1H1M STAT


 IV  17 22:26


 17 23:26 DC 17 23:09


999 MLS/HR


 


 Acetaminophen


  (Tylenol Tab)  1,000 mg  NOW  STAT


 PO  17 22:26


 17 22:31 DC 17 23:10


1,000 MG


 


 Ketorolac


 Tromethamine


  (Toradol Inj)  30 mg  NOW  STAT


 IV  17 23:53


 17 23:54 DC 8/3/17 00:09


30 MG


 


 Ceftriaxone Sodium


  (Rocephin Inj)  1 gm  NOW  STAT


 IV  8/3/17 01:36


 8/3/17 01:40 DC 8/3/17 01:47


1 GM


 


 Azithromycin


  (Zithromax Tab)  500 mg  NOW  STAT


 PO  8/3/17 01:36


 8/3/17 01:40 DC 8/3/17 01:48


500 MG











ED Course


The patient was evaluated as above.  Labs were drawn and IV access was 

obtained.  





Patient was medicated with 1 L normal saline solution and given Tylenol orally.





Patient was reassessed and requested something more for pain.  She was given 

Toradol and an additional liter bolus.





Imaging studies were performed and read by radiology as above. 





Patient was reevaluated and findings were discussed.  She was given 1 g 

Rocephin and initial dose of Zithromax.





Discharge instructions were reviewed with the patient.  The patient verbalized 

understanding of my assessment and treatment plan and was discharged home in 

good condition.





Medical Decision


Differential diagnosis includes sepsis, pneumonia, bronchitis, epidural abscess

, urinary tract infection, among others.





The patient is a 47-year-old female who presents today complaining of fever, 

bodyaches and cough.  The patient also complains of back pain and episodes of 

incontinence.  Labs revealed no leukocytosis, anemia or concerning electrolyte 

abnormalities.  Lactic acid was not elevated.  Blood cultures were drawn and 

are pending.  Urinalysis was not suggestive of infection.  Chest x-ray did show 

developing perihilar infiltrates.  Due to the patient's complaint of low back 

pain and incontinence, a CT scan was performed to rule out epidural abscess.  

This was read by stat rad and was negative.





The patient was concerned because she had previously been admitted for sepsis, 

however on review of those records it appears that the patient had bronchitis 

rather than actual sepsis.  She will be covered on antibiotics but I do feel 

she is safe to be discharged home and follow-up with her primary care provider.

  She was given an initial dose of Rocephin and Zithromax.  However, due to 

potential reactions with methadone, which the patient did not admit to taking, 

she will be placed on Augmentin rather than Zithromax.  She was instructed to 

return here if she has any worsening of her current condition or new/concerning 

symptoms.





The patient's case was reviewed with Dr. Green, ED attending physician, who 

agreed with my assessment and treatment plan.





Based on the patient's presentation and work up, I feel the patient is stable 

for outpatient treatment.  The patient was educated to return to the emergency 

department for any worsening of their current condition or new/concerning 

symptoms.  She will follow up with her PCP.





Medication Reconcilliation


Current Medication List:  was personally reviewed by me





Blood Pressure Screening


Patient's blood pressure:  Normal blood pressure





Impression





 Primary Impression:  


 Acute bronchitis





Departure Information


Dispostion


Home / Self-Care





Condition


GOOD





Prescriptions





Amoxicillin & Pot Clavulanate (Augmentin 875-125 mg) 1 Tab Tab


1 TAB PO BID for 7 Days, #14 TAB


   Prov: Aye Jasmine ., MYRANDA         8/3/17





Referrals


No Doctor, Assigned (PCP)





Patient Instructions


My [facility]





Additional Instructions





You were prescribed Augmentin to be taken twice daily as prescribed. This is an 

antibiotic. All antibiotics have the potential to cause diarrhea. Stop this 

medication and contact a medical provider if you were to develop any 

significant adverse side effects including: wheezing, shortness of breath, 

passing out, vomiting, or a diffuse rash. Always take antibiotics as directed 

and COMPLETE the ENTIRE course regardless of the improvement of your symptoms.





For pain control, you can use the following over-the-counter medicines (if >11 yo):





- Regular strength (325mg/tab) Tylenol (acetaminophen) 2 tabs every 4-6 hours 

as needed. Do not exceed 12 tablets in a 24 hour period. Avoid taking more than 

4 grams (4000 mg) of Tylenol per day. This includes any other sources of 

acetaminophen you may take on a regular basis.





- Regular strength (200 mg/tab) Advil (ibuprofen) 1-2 tabs every 4-6 hours as 

needed. Do not exceed a dose of 3200 mg per day.





Rest and drink plenty of fluids.





Follow-up with your primary care provider this week.





Return to the emergency department with any worsening or new/concerning 

symptoms.





Problem Qualifiers








 Primary Impression:  


 Acute bronchitis

## 2017-08-02 NOTE — DIAGNOSTIC IMAGING REPORT
CHEST ONE VIEW PORTABLE



CLINICAL HISTORY: cough, fever dyspnea



COMPARISON STUDY:  6/26/2017



FINDINGS: Probable early bilateral parenchymal perihilar infiltrative change.

Diaphragms smooth. Costophrenic angles sharp. 



IMPRESSION:  Developing perihilar parenchymal infiltrates bilaterally. 











The above report was generated using voice recognition software.  It may contain

grammatical, syntax or spelling errors.









Electronically signed by:  Tim Sanchez M.D.

8/2/2017 10:43 PM



Dictated Date/Time:  8/2/2017 10:42 PM

## 2017-08-03 VITALS — HEART RATE: 84 BPM | SYSTOLIC BLOOD PRESSURE: 125 MMHG | DIASTOLIC BLOOD PRESSURE: 67 MMHG | OXYGEN SATURATION: 96 %

## 2017-08-03 VITALS — TEMPERATURE: 99.32 F

## 2017-08-03 NOTE — DIAGNOSTIC IMAGING REPORT
CT SCAN OF THE LUMBAR SPINE COMBO



CLINICAL HISTORY: Low back pain. Fever. Bowel and urinary incontinence.



COMPARISON STUDY: Radiographs of the lumbar spine dated 4/8/2016. MRI of the

lumbar spine dated 8/2/2009. CT scan of lumbar spine dated 4/25/2009.



TECHNIQUE: Before and following the IV administration of 118 cc of Optiray 320,

CT scan of lumbar spine is performed from the lower thoracic spine to the

sacrum. Images reviewed in the axial, sagittal, and coronal planes. IV contrast

was administered without complication. A dose lowering technique was utilized

adhering to the principles of ALARA.



CT DOSE: 2095.07 mGy.cm



FINDINGS: The skeletal structures are well mineralized. There is no evidence of

fracture or malalignment involving the lumbar spine. Vertebral body height and

alignment are maintained. The transverse and spinous processes appear intact.

There is no evidence of spondylolysis. No lytic or blastic bony lesions are

seen. No erosive change is identified. There is minimal disc space narrowing and

vacuum phenomenon seen at L5-S1. The remaining disc spaces are preserved.

Minimal disc bulge is seen at L4-L5 and L5-S1. There is no evidence of large

disc herniation or significant acquired compromise of the central canal. There

is no evidence of epidural pleural fluid collection on the postcontrast series.

No significant neural foraminal stenosis is suggested. The visualized sacrum and

bony pelvis appear intact. Tarlov cysts are similar to prior studies. The

paraspinous soft tissues are within normal limits. The visualized

retroperitoneal structures are grossly normal but in completely assessed. A

retroaortic left renal vein is incidentally noted.



IMPRESSION: No acute bony abnormality is seen involving the lumbar spine.









Dictated:  8/3/2017 7:13 AM

Transcribed:  8/3/2017 7:38 AM

Murali







Electronically signed by:  Darshan Montgomery M.D.

8/3/2017 7:41 AM



Dictated Date/Time:  8/3/2017 7:13 AM

## 2017-09-08 ENCOUNTER — HOSPITAL ENCOUNTER (EMERGENCY)
Dept: HOSPITAL 45 - C.EDB | Age: 47
Discharge: HOME | End: 2017-09-08
Payer: COMMERCIAL

## 2017-09-08 VITALS
HEIGHT: 67.99 IN | WEIGHT: 209.88 LBS | WEIGHT: 209.88 LBS | BODY MASS INDEX: 31.81 KG/M2 | HEIGHT: 67.99 IN | BODY MASS INDEX: 31.81 KG/M2

## 2017-09-08 VITALS — TEMPERATURE: 97.7 F

## 2017-09-08 VITALS — OXYGEN SATURATION: 96 % | SYSTOLIC BLOOD PRESSURE: 133 MMHG | HEART RATE: 91 BPM | DIASTOLIC BLOOD PRESSURE: 71 MMHG

## 2017-09-08 VITALS — OXYGEN SATURATION: 96 %

## 2017-09-08 DIAGNOSIS — R11.0: ICD-10-CM

## 2017-09-08 DIAGNOSIS — R07.89: Primary | ICD-10-CM

## 2017-09-08 DIAGNOSIS — Z87.19: ICD-10-CM

## 2017-09-08 DIAGNOSIS — Z82.49: ICD-10-CM

## 2017-09-08 DIAGNOSIS — Z86.19: ICD-10-CM

## 2017-09-08 DIAGNOSIS — R51: ICD-10-CM

## 2017-09-08 DIAGNOSIS — Z83.3: ICD-10-CM

## 2017-09-08 DIAGNOSIS — J40: ICD-10-CM

## 2017-09-08 DIAGNOSIS — F17.200: ICD-10-CM

## 2017-09-08 DIAGNOSIS — Z79.899: ICD-10-CM

## 2017-09-08 DIAGNOSIS — Z84.1: ICD-10-CM

## 2017-09-08 DIAGNOSIS — M79.1: ICD-10-CM

## 2017-09-08 DIAGNOSIS — Z83.79: ICD-10-CM

## 2017-09-08 DIAGNOSIS — E78.5: ICD-10-CM

## 2017-09-08 DIAGNOSIS — I45.10: ICD-10-CM

## 2017-09-08 DIAGNOSIS — E11.9: ICD-10-CM

## 2017-09-08 DIAGNOSIS — M54.9: ICD-10-CM

## 2017-09-08 DIAGNOSIS — R00.0: ICD-10-CM

## 2017-09-08 DIAGNOSIS — R50.9: ICD-10-CM

## 2017-09-08 LAB
ALBUMIN/GLOB SERPL: 0.9 {RATIO} (ref 0.9–2)
ALP SERPL-CCNC: 149 U/L (ref 45–117)
ALT SERPL-CCNC: 56 U/L (ref 12–78)
ANION GAP SERPL CALC-SCNC: 4 MMOL/L (ref 3–11)
AST SERPL-CCNC: 37 U/L (ref 15–37)
BASOPHILS # BLD: 0.03 K/UL (ref 0–0.2)
BASOPHILS NFR BLD: 0.3 %
BUN SERPL-MCNC: 10 MG/DL (ref 7–18)
BUN/CREAT SERPL: 11.5 (ref 10–20)
CALCIUM SERPL-MCNC: 8.9 MG/DL (ref 8.5–10.1)
CHLORIDE SERPL-SCNC: 106 MMOL/L (ref 98–107)
CO2 SERPL-SCNC: 29 MMOL/L (ref 21–32)
COMPLETE: YES
CREAT CL PREDICTED SERPL C-G-VRATE: 97.5 ML/MIN
CREAT SERPL-MCNC: 0.87 MG/DL (ref 0.6–1.2)
EOSINOPHIL NFR BLD AUTO: 157 K/UL (ref 130–400)
GLOBULIN SER-MCNC: 3.7 GM/DL (ref 2.5–4)
GLUCOSE SERPL-MCNC: 112 MG/DL (ref 70–99)
HCT VFR BLD CALC: 39.1 % (ref 37–47)
IG%: 0.5 %
IMM GRANULOCYTES NFR BLD AUTO: 21.8 %
INR PPP: 1 (ref 0.9–1.1)
LYME DISEASE AB IGG: (no result)
LYME DISEASE AB IGM: (no result)
LYMPHOCYTES # BLD: 2.16 K/UL (ref 1.2–3.4)
MCH RBC QN AUTO: 30 PG (ref 25–34)
MCHC RBC AUTO-ENTMCNC: 33 G/DL (ref 32–36)
MCV RBC AUTO: 90.9 FL (ref 80–100)
MONOCYTES NFR BLD: 5.7 %
NEUTROPHILS # BLD AUTO: 5 %
NEUTROPHILS NFR BLD AUTO: 66.7 %
PARTIAL THROMBOPLASTIN RATIO: 1.1
PMV BLD AUTO: 10.6 FL (ref 7.4–10.4)
POTASSIUM SERPL-SCNC: 3.6 MMOL/L (ref 3.5–5.1)
PROTHROMBIN TIME: 10.3 SECONDS (ref 9–12)
RBC # BLD AUTO: 4.3 M/UL (ref 4.2–5.4)
SODIUM SERPL-SCNC: 139 MMOL/L (ref 136–145)
WBC # BLD AUTO: 9.93 K/UL (ref 4.8–10.8)

## 2017-09-08 NOTE — DIAGNOSTIC IMAGING REPORT
CHEST 2 VIEWS ROUTINE



CLINICAL HISTORY: eval for pnea dyspnea



COMPARISON STUDY:  8/2/2017



FINDINGS: The bones soft tissues and hemidiaphragms are normal. The

cardiomediastinal silhouette is normal. The lungs are clear. The pulmonary

vasculature is normal. 



IMPRESSION:  Negative chest. 











The above report was generated using voice recognition software.  It may contain

grammatical, syntax or spelling errors.









Electronically signed by:  Tim Sanchez M.D.

9/8/2017 9:34 PM



Dictated Date/Time:  9/8/2017 9:34 PM

## 2017-09-09 NOTE — EMERGENCY ROOM VISIT NOTE
History


Report prepared by Lida:  Breonna Saenz


Under the Supervision of:  Dr. Raimundo Cage M.D.


First contact with patient:  19:29


Chief Complaint:  CHEST PAIN


Stated Complaint:  CHEST PAIN, TROUBLE BREATHING,FEVER,PAIN


Nursing Triage Summary:  


Chest pain and feeling short winded since yesterday, states comes and goes.  


Body aches and no energy per pt.  Admits to having nasal congestion and a moist 


cough.  Pt states when she breathes, the pain feels worse.  "I have bruising 

all 


over my legs and stomach and I don't know where they come from." per pt.





History of Present Illness


The patient is a 47 year old female who presents to the Emergency Room with 

complaints of persistent chest pain for the past 2 weeks. She describes the 

pain as a burning ache. It worsens when she coughs. She also has back pain with 

coughing. She has been feeling low energy. She has bruises all over her body 

and her bones feel achy. She feels a rattling in her chest when she breathes 

and feels congested. She has a nonproductive cough. She also had a fever of 102 

and headache. She states that her symptoms feel like her previous pneumonia. 

She is nauseous which she attributes to the pain.  She notes that her ankles 

are itchy. She denies any vomiting. She denies any recent tick bites. She took 

2 ibuprofen and 1 Tylenol 2 hours ago for her pain.





   Source of History:  patient


   Onset:  2 weeks


   Position:  chest


   Quality:  ache, burning


   Timing:  other (persistent)


   Modifying Factors (Worsening):  other (cough)


   Associated Symptoms:  + fevers, + headache, + cough, + nausea, + back pain, 

+ fatigue, No vomiting


Note:


Pt reports aching bones, bruising.





Review of Systems


See HPI for pertinent positives & negatives. A total of 10 systems reviewed and 

were otherwise negative.





Past Medical & Surgical


Medical Problems:


(1) Chronic pain syndrome


(2) Diabetes mellitus type 2


(3) dyslipidemia


(4) endometrial cryoablation


(5) History of -  section


(6) History of - tubal ligation


(7) History of cholecystectomy


(8) Nasal polyp


(9) Sepsis


(10) Ulcer


Surgical Problems:


(1) Hx of cholecystectomy








Family History





Cancer


Diabetes mellitus


Gallbladder disease


Heart disease


Hypertension


Kidney stones





Social History


Smoking Status:  Current Every Day Smoker


Alcohol Use:  none


Marital Status:  single


Occupation Status:  other





Current/Historical Medications


Scheduled


Amphetamine-Dextroamphetamine 30MG (Adderall 30MG), 30 MG PO BID


Docusate Sodium (Docusate Sodium), 100 MG PO DAILY


Gabapentin (Gabapentin), 300 MG PO AFTERNOON


Gabapentin (Gabapentin), 300 MG PO QAM


Gabapentin (Neurontin), 600 MG PO HS


Gabapentin (Gabapentin), 600 MG PO TID


Methadone Hcl (Methadone Hcl), 149 MG PO QAM


Omeprazole (Prilosec), 20 MG PO BID


Propranolol (Inderal), 10 MG PO DAILY


Venlafaxine Hcl (Effexor Extended Rel), 75 MG PO DAILY


Venlafaxine Hcl (Effexor Extended Rel), 150 MG PO DAILY





Scheduled PRN


Clonazepam (Klonopin), 1 MG PO DAILY PRN for Anxiety





Allergies


Coded Allergies:  


     Tramadol (Verified  Allergy, Unknown, SEIZURE, 17)


     Promethazine (Verified  Adverse Reaction, Mild, JITTERY, 17)


 PER PATIENT, GETS JITTERY. NOT ALLERGY





Physical Exam


Vital Signs











  Date Time  Temp Pulse Resp B/P (MAP) Pulse Ox O2 Delivery O2 Flow Rate FiO2


 


17 21:58  91 18 133/71 96 Room Air  


 


17 20:12  96 18 134/76 96 Room Air  


 


17 19:42     96 Room Air  


 


17 19:37  113      


 


17 18:41 36.5 116 20 147/79 93 Room Air  











Physical Exam


Constitutional: Vital signs reviewed.


Eyes: Pupils are equal round reactive to light.  Conjunctiva are noninjected.  


ENT: Pharynx is clear without erythema or exudate.  Mucous membranes are moist.

  Neck supple without meningeal signs.


Respiratory: Clear to auscultation bilaterally.  Breath sounds are equal 

bilaterally. 


Cardiovascular: Tachycardic rate and regular rhythm.  No rubs or gallops.


GI: Soft, nondistended and nontender.  Bowel sounds are present.


Musculoskeletal: No peripheral edema.  No lower extremity tenderness. 

Reproducible chest wall tenderness to palpation. 


Integumentary: No cyanosis.


Neurological:  The patient is awake and alert.  Cranial nerves II-XII are 

intact. Motor is 5 out of 5 all extremities.  Sensation is intact to light 

touch all extremities.  Normal speech.  No pronator drift.  Negative Brudzinski 

sign.


Psychiatric: Normal affect.





Medical Decision & Procedures


ER Provider


Diagnostic Interpretation:


X-ray results as stated below per interpretation by me and the radiologist: 





CHEST 2 VIEWS ROUTINE





CLINICAL HISTORY: eval for pnea dyspnea





COMPARISON STUDY:  2017





FINDINGS: The bones soft tissues and hemidiaphragms are normal. The


cardiomediastinal silhouette is normal. The lungs are clear. The pulmonary


vasculature is normal. 





IMPRESSION:  Negative chest. 





The above report was generated using voice recognition software.  It may contain


grammatical, syntax or spelling errors.





Electronically signed by:  Tim Sanchez M.D.


2017 9:34 PM





Dictated Date/Time:  2017 9:34 PM





Laboratory Results


17 20:00








Red Blood Count 4.30, Mean Corpuscular Volume 90.9, Mean Corpuscular Hemoglobin 

30.0, Mean Corpuscular Hemoglobin Concent 33.0, Mean Platelet Volume 10.6, 

Neutrophils (%) (Auto) 66.7, Lymphocytes (%) (Auto) 21.8, Monocytes (%) (Auto) 

5.7, Eosinophils (%) (Auto) 5.0, Basophils (%) (Auto) 0.3, Neutrophils # (Auto) 

6.62, Lymphocytes # (Auto) 2.16, Monocytes # (Auto) 0.57, Eosinophils # (Auto) 

0.50, Basophils # (Auto) 0.03





17 20:00

















Test


  17


20:00 17


20:02 17


20:09 17


20:17


 


White Blood Count


  9.93 K/uL


(4.8-10.8) 


  


  


 


 


Red Blood Count


  4.30 M/uL


(4.2-5.4) 


  


  


 


 


Hemoglobin


  12.9 g/dL


(12.0-16.0) 


  


  


 


 


Hematocrit 39.1 % (37-47)    


 


Mean Corpuscular Volume


  90.9 fL


() 


  


  


 


 


Mean Corpuscular Hemoglobin


  30.0 pg


(25-34) 


  


  


 


 


Mean Corpuscular Hemoglobin


Concent 33.0 g/dl


(32-36) 


  


  


 


 


Platelet Count


  157 K/uL


(130-400) 


  


  


 


 


Mean Platelet Volume


  10.6 fL


(7.4-10.4) 


  


  


 


 


Neutrophils (%) (Auto) 66.7 %    


 


Lymphocytes (%) (Auto) 21.8 %    


 


Monocytes (%) (Auto) 5.7 %    


 


Eosinophils (%) (Auto) 5.0 %    


 


Basophils (%) (Auto) 0.3 %    


 


Neutrophils # (Auto)


  6.62 K/uL


(1.4-6.5) 


  


  


 


 


Lymphocytes # (Auto)


  2.16 K/uL


(1.2-3.4) 


  


  


 


 


Monocytes # (Auto)


  0.57 K/uL


(0.11-0.59) 


  


  


 


 


Eosinophils # (Auto)


  0.50 K/uL


(0-0.5) 


  


  


 


 


Basophils # (Auto)


  0.03 K/uL


(0-0.2) 


  


  


 


 


RDW Standard Deviation


  50.0 fL


(36.4-46.3) 


  


  


 


 


RDW Coefficient of Variation


  15.0 %


(11.5-14.5) 


  


  


 


 


Immature Granulocyte % (Auto) 0.5 %    


 


Immature Granulocyte # (Auto)


  0.05 K/uL


(0.00-0.02) 


  


  


 


 


Prothrombin Time


  10.3 SECONDS


(9.0-12.0) 


  


  


 


 


Prothromb Time International


Ratio 1.0 (0.9-1.1) 


  


  


  


 


 


Activated Partial


Thromboplast Time 28.2 SECONDS


(21.0-31.0) 


  


  


 


 


Partial Thromboplastin Ratio 1.1    


 


Anion Gap


  4.0 mmol/L


(3-11) 


  


  


 


 


Est Creatinine Clear Calc


Drug Dose 97.5 ml/min 


  


  


  


 


 


Estimated GFR (


American) 92.6 


  


  


  


 


 


Estimated GFR (Non-


American 79.9 


  


  


  


 


 


BUN/Creatinine Ratio 11.5 (10-20)    


 


Calcium Level


  8.9 mg/dl


(8.5-10.1) 


  


  


 


 


Total Bilirubin


  0.3 mg/dl


(0.2-1) 


  


  


 


 


Aspartate Amino Transf


(AST/SGOT) 37 U/L (15-37) 


  


  


  


 


 


Alanine Aminotransferase


(ALT/SGPT) 56 U/L (12-78) 


  


  


  


 


 


Alkaline Phosphatase


  149 U/L


() 


  


  


 


 


Total Protein


  7.2 gm/dl


(6.4-8.2) 


  


  


 


 


Albumin


  3.5 gm/dl


(3.4-5.0) 


  


  


 


 


Globulin


  3.7 gm/dl


(2.5-4.0) 


  


  


 


 


Albumin/Globulin Ratio 0.9 (0.9-2)    


 


Lyme Disease IgG Antibody NEG (NEG)    


 


Lyme Disease IgM Antibody NEG (NEG)    


 


Bedside Lactic Acid Venous


  


  1.24 mmol/L


(0.90-1.70) 


  


 


 


Bedside Troponin I


  


  


  < 0.030 ng/ml


(0-0.045) 


 


 


Influenza Type A Antigen


  


  


  


  Neg for Influ


A (NEG)


 


Influenza Type B Antigen


  


  


  


  Neg for Influ


B (NEG)





Laboratory results as reviewed by me.





Medications Administered











 Medications


  (Trade)  Dose


 Ordered  Sig/Anibal


 Route  Start Time


 Stop Time Status Last Admin


Dose Admin


 


 Sodium Chloride  1,000 ml @ 


 999 mls/hr  Q1H1M STAT


 IV  17 19:38


 17 20:38 DC 17 19:38


999 MLS/HR


 


 Acetaminophen


  (Tylenol Tab)  325 mg  NOW  STAT


 PO  17 20:13


 17 20:14 DC 17 20:32


325 MG











ECG


Indication:  chest pain


Rate (beats per minute):  110


Rhythm:  sinus tachycardia


Findings:  nonspecific-ST abn (V3-V6), RBBB (incomplete)





ED Course


: The patient was evaluated in room A2. A complete history and physical 

exam was performed.





: NSS 1000 ml @ 999 mls/hr IV.





: Acetaminophen 325 mg PO. 





: Upon reevaluation, the patient appeared to have improvement of her 

symptoms. I discussed celestina's findings with her. She verbalized agreement of 

the treatment plan. She was discharged home.





Medical Decision


This is a 47-year-old female presents with fever and malaise.  Differential 

diagnoses: Viral syndrome, pneumonia, bronchitis, Influenza, Lyme disease, I 

did perform a limited focused review of portions of the patient's old chart on 

the electronic medical record. The patient was here  for chest pain, 

fever, and headache. She was diagnosed with acute bronchitis and discharged on 

Augmentin. 





I did evaluate the patient as noted above.  The patient is presenting with a 

nonproductive cough and fever.  She states she feels like when she previously 

had pneumonia.  She complains of chest pain but has very reproducible chest 

wall tenderness.  She is not hypoxic.  IV access was established.  The patient 

was placed on a continuous cardiac monitor.  I did order and personally review 

the patient's 12-lead EKG and chest x-ray as described above.  There is no 

evidence of pneumonia on chest x-ray.  She does have some rate related 

nonspecific changes on her EKG.  I did order and review the patient's blood 

work as noted in the electronic medical record.  Troponin is negative.  Her 

white blood cell count is not elevated.  Lyme testing is negative.  Influenza 

swab was also negative.  I did treat the patient with normal saline IV.  She 

was also given Tylenol for her pain.  I did discuss the test results with the 

patient.  At this time her symptoms likely seem viral in nature.  I did 

recommend continuing over-the-counter remedies and close follow up with her 

doctor.  She was discharged in good condition.





Medication Reconcilliation


Current Medication List:  was personally reviewed by me





Blood Pressure Screening


Patient's blood pressure:  Elevated blood pressure


Blood pressure disposition:  Referred to PCP





Impression





 Primary Impression:  


 Musculoskeletal chest pain


 Additional Impressions:  


 Bronchitis


 Myalgia





Scribe Attestation


The scribe's documentation has been prepared under my direct and personally 

reviewed by me in its entirety. I confirm that the note above accurately 

reflects all work, treatment, procedures, and medical decision making performed 

by me.





Departure Information


Dispostion


Home / Self-Care





Referrals


No Doctor, Assigned (PCP)





Forms


Call Back Authorization, HOME CARE DOCUMENTATION FORM,                         

                                       IMPORTANT VISIT INFORMATION





Patient Instructions


ED Chest Pain Atypical [place] [facility], My [facility]





Additional Instructions





You have been examined and treated today on an emergency basis only. This is 

not a substitute for, or an effort to provide, complete comprehensive medical 

care. It is impossible to recognize and treat all injuries or illnesses in a 

single emergency department visit. It is therefore important that you follow up 

closely with your physician.  Call as soon as possible for an appointment.  

Return for worsening symptoms or if you develop rash, vomiting or any other 

concerning symptoms.





Problem Qualifiers

## 2017-09-17 ENCOUNTER — HOSPITAL ENCOUNTER (INPATIENT)
Dept: HOSPITAL 45 - C.EDB | Age: 47
LOS: 4 days | Discharge: HOME | DRG: 167 | End: 2017-09-21
Attending: HOSPITALIST | Admitting: HOSPITALIST
Payer: COMMERCIAL

## 2017-09-17 VITALS — OXYGEN SATURATION: 94 % | HEART RATE: 68 BPM

## 2017-09-17 VITALS
BODY MASS INDEX: 32.21 KG/M2 | HEIGHT: 68 IN | HEIGHT: 68 IN | WEIGHT: 212.53 LBS | WEIGHT: 212.53 LBS | BODY MASS INDEX: 32.21 KG/M2

## 2017-09-17 VITALS
DIASTOLIC BLOOD PRESSURE: 74 MMHG | TEMPERATURE: 97.88 F | OXYGEN SATURATION: 94 % | HEART RATE: 69 BPM | SYSTOLIC BLOOD PRESSURE: 118 MMHG

## 2017-09-17 VITALS
TEMPERATURE: 98.6 F | DIASTOLIC BLOOD PRESSURE: 71 MMHG | OXYGEN SATURATION: 92 % | HEART RATE: 83 BPM | SYSTOLIC BLOOD PRESSURE: 116 MMHG

## 2017-09-17 VITALS — OXYGEN SATURATION: 91 %

## 2017-09-17 VITALS — OXYGEN SATURATION: 91 % | HEART RATE: 84 BPM

## 2017-09-17 VITALS
TEMPERATURE: 98.42 F | HEART RATE: 72 BPM | OXYGEN SATURATION: 91 % | SYSTOLIC BLOOD PRESSURE: 106 MMHG | DIASTOLIC BLOOD PRESSURE: 65 MMHG

## 2017-09-17 VITALS — HEART RATE: 77 BPM | OXYGEN SATURATION: 92 %

## 2017-09-17 DIAGNOSIS — Z87.01: ICD-10-CM

## 2017-09-17 DIAGNOSIS — Z84.1: ICD-10-CM

## 2017-09-17 DIAGNOSIS — D86.1: ICD-10-CM

## 2017-09-17 DIAGNOSIS — Z87.11: ICD-10-CM

## 2017-09-17 DIAGNOSIS — F11.20: ICD-10-CM

## 2017-09-17 DIAGNOSIS — Z82.49: ICD-10-CM

## 2017-09-17 DIAGNOSIS — Z83.3: ICD-10-CM

## 2017-09-17 DIAGNOSIS — F41.9: ICD-10-CM

## 2017-09-17 DIAGNOSIS — Z79.899: ICD-10-CM

## 2017-09-17 DIAGNOSIS — F17.200: ICD-10-CM

## 2017-09-17 DIAGNOSIS — E11.9: ICD-10-CM

## 2017-09-17 DIAGNOSIS — J18.9: Primary | ICD-10-CM

## 2017-09-17 DIAGNOSIS — M79.7: ICD-10-CM

## 2017-09-17 LAB
ALP SERPL-CCNC: 163 U/L (ref 45–117)
ALT SERPL-CCNC: 28 U/L (ref 12–78)
ANION GAP SERPL CALC-SCNC: 7 MMOL/L (ref 3–11)
AST SERPL-CCNC: 37 U/L (ref 15–37)
BASE EXCESS STD BLDV CALC-SCNC: 0.9 MEQ/L
BASOPHILS # BLD: 0.03 K/UL (ref 0–0.2)
BASOPHILS NFR BLD: 0.3 %
BUN SERPL-MCNC: 9 MG/DL (ref 7–18)
BUN/CREAT SERPL: 12.5 (ref 10–20)
CALCIUM SERPL-MCNC: 9.2 MG/DL (ref 8.5–10.1)
CHLORIDE SERPL-SCNC: 103 MMOL/L (ref 98–107)
CO2 SERPL-SCNC: 26 MMOL/L (ref 21–32)
COMPLETE: YES
CREAT CL PREDICTED SERPL C-G-VRATE: 120.2 ML/MIN
CREAT SERPL-MCNC: 0.71 MG/DL (ref 0.6–1.2)
EOSINOPHIL NFR BLD AUTO: 230 K/UL (ref 130–400)
GLUCOSE SERPL-MCNC: 120 MG/DL (ref 70–99)
HCT VFR BLD CALC: 36.9 % (ref 37–47)
IG%: 1 %
IMM GRANULOCYTES NFR BLD AUTO: 12.8 %
LYMPHOCYTES # BLD: 1.24 K/UL (ref 1.2–3.4)
MCH RBC QN AUTO: 30.2 PG (ref 25–34)
MCHC RBC AUTO-ENTMCNC: 33.3 G/DL (ref 32–36)
MCV RBC AUTO: 90.7 FL (ref 80–100)
MONOCYTES NFR BLD: 5.5 %
NEUTROPHILS # BLD AUTO: 2.2 %
NEUTROPHILS NFR BLD AUTO: 78.2 %
PCO2 BLDV: 38 MMHG (ref 38–50)
PMV BLD AUTO: 10.2 FL (ref 7.4–10.4)
PO2 BLDV: 68 MMHG
POTASSIUM SERPL-SCNC: 3.8 MMOL/L (ref 3.5–5.1)
RBC # BLD AUTO: 4.07 M/UL (ref 4.2–5.4)
SAO2 % BLDV FROM PO2: 92.2 %
SODIUM SERPL-SCNC: 136 MMOL/L (ref 136–145)
WBC # BLD AUTO: 9.71 K/UL (ref 4.8–10.8)

## 2017-09-17 RX ADMIN — IPRATROPIUM BROMIDE AND ALBUTEROL SULFATE SCH ML: .5; 3 SOLUTION RESPIRATORY (INHALATION) at 15:25

## 2017-09-17 RX ADMIN — IPRATROPIUM BROMIDE AND ALBUTEROL SULFATE SCH ML: .5; 3 SOLUTION RESPIRATORY (INHALATION) at 19:51

## 2017-09-17 RX ADMIN — CLONAZEPAM PRN MG: 1 TABLET ORAL at 15:22

## 2017-09-17 RX ADMIN — LANSOPRAZOLE SCH MG: 15 TABLET, ORALLY DISINTEGRATING, DELAYED RELEASE ORAL at 20:52

## 2017-09-17 RX ADMIN — GABAPENTIN SCH MG: 300 CAPSULE ORAL at 20:52

## 2017-09-17 NOTE — DIAGNOSTIC IMAGING REPORT
(CHEST FOR PE) ANGIO WITH



CLINICAL HISTORY: 47 years-old Female presenting with hypoxia, chest congestion.





TECHNIQUE: Multidetector CT angiography of the chest was performed after

administration of intravenous contrast. 3-D volumetric and/or maximum intensity

projection (MIP) images were subsequently reconstructed for review. IV contrast:

78 mL of Optiray 320. A dose lowering technique was used consistent with the

principles of ALARA (as low as reasonably achievable).



COMPARISON: None.



CT DOSE (mGy.cm): The estimated cumulative dose is 499.20 mGy.cm.



FINDINGS:



 topogram: Unremarkable.



Pulmonary vasculature:



The study is adequate for assessment of the pulmonary vascular tree. No filling

defect within the pulmonary arteries to suggest embolus. Main pulmonary artery

is not enlarged. No flattening of the interventricular septum. No intracardiac

intracardiac filling defect. No reflux of contrast into the hepatic veins.



Remaining chest:



On soft tissue windows, normal thyroid and thoracic inlet. Prominent mediastinal

lymph nodes, increased in size since the prior exam. An index node in the

prevascular region now measures 12 mm in short axis, previously 9 mm. Increased

prominence of precarinal and subcarinal lymph nodes as well as bilateral hilar

lymph nodes. Normal aorta. Normal heart size. No pericardial or pleural

effusion. The spleen is mildly enlarged, similar to prior exam.



On lung windows, patchy groundglass opacities have increased from prior exam,

which affects the upper lobes to the greatest degree. More solid consolidation

and centrilobular nodularity noted at the left lung base. No significant septal

thickening. No fissural nodularity. Airways patent.



On bone windows, normal osseous structures.



IMPRESSION:

1.  No evidence of pulmonary embolus.



2.  Interval increase in patchy bilateral groundglass opacities affecting the

upper lobes to the greatest degree. To be a chronic lacking in weaning process.

Given the presence of increasing mediastinal and hilar lymphadenopathy,

sarcoidosis is a diagnostic consideration. Other differential etiologies include

pneumocystis pneumonia, chronic eosinophilic pneumonia, and hypersensitivity

pneumonitis.



3.  Superimposed centrilobular nodularity and more solid consolidation at the

left lung base, which raises concern for bronchopneumonia. However, this could

also be another manifestation of the more global pulmonary disease.







Electronically signed by:  Lamin Sanchez M.D.

9/17/2017 11:26 AM



Dictated Date/Time:  9/17/2017 11:15 AM

## 2017-09-17 NOTE — EMERGENCY ROOM VISIT NOTE
History


Report prepared by Lida:  Cathie Cho


Under the Supervision of:  Dr. Van Pompa M.D.


First contact with patient:  08:13


Chief Complaint:  RESPIRATORY PROBLEMS


Stated Complaint:  SOB, CHEST CONGESTION, FEVER





History of Present Illness


The patient is a 47 year old female who presents to the Emergency Room with 

complaints of constant shortness of breath beginning 1 week ago. The patient 

states that she was seen here 1 week ago and has been on Augmentin. She reports 

that she checked her pulse ox at home and it was between 84 and 88. She notes 

that she is not on oxygen at home and has had respiratory difficulty or 

pneumonia 4 times since January. The patient complains of fever, achiness, cough

, chest congestion, abdominal bloating, leg pain, back pain, nausea, and 

headaches. She denies any constipation, diarrhea, urinary symptoms, and 

vomiting. She states that she is a smoker and has been smoke a 1/2 pack a day 

since she has been having shortness of breath. She notes that she has an 

inhaler at home that she has been using every 5-6 hours. The patient states 

that she has been admitted before for COPD and this feels worse than her 

previous symptoms. She notes that laying down worsens her symptoms.





   Source of History:  patient


   Onset:  1 week ago


   Position:  other (respiratory)


   Quality:  other (SOB)


   Timing:  constant


   Modifying Factors (Worsening):  other (laying down)


   Associated Symptoms:  + fevers, + headache, + cough, + nausea, + back pain, 

No vomiting, No abdominal pain, No diarrhea, No urinary symptoms


Note:


The patient complains of fever, achiness, leg pain, chest congestion, abdominal 

bloating. She denies any constipation.





Review of Systems


See HPI for pertinent positives and negatives.  A total of ten systems were 

reviewed and were otherwise negative.





Past Medical & Surgical


Medical Problems:


(1) Chronic pain syndrome


(2) Diabetes mellitus type 2


(3) dyslipidemia


(4) endometrial cryoablation


(5) Fever


(6) History of -  section


(7) History of - tubal ligation


(8) History of cholecystectomy


(9) Nasal polyp


(10) Sepsis


(11) SOB (shortness of breath)


(12) Ulcer


Surgical Problems:


(1) Hx of cholecystectomy








Family History





Cancer


Diabetes mellitus


Gallbladder disease


Heart disease


Hypertension


Kidney stones





Social History


Smoking Status:  Current Every Day Smoker


Alcohol Use:  none


Marital Status:  single


Occupation Status:  other





Current/Historical Medications


Scheduled


Amphetamine-Dextroamphetamine 30MG (Adderall 30MG), 10 MG PO BID


Gabapentin (Gabapentin), 300 MG PO AFTERNOON


Gabapentin (Gabapentin), 300 MG PO QAM


Gabapentin (Neurontin), 600 MG PO HS


Methadone Hcl (Methadone Hcl), 149 MG PO QAM


Omeprazole (Prilosec), 20 MG PO BID


Venlafaxine Hcl (Effexor Extended Rel), 75 MG PO DAILY


Venlafaxine Hcl (Effexor Extended Rel), 150 MG PO DAILY





Scheduled PRN


Clonazepam (Klonopin), 1 MG PO Q6 PRN for Anxiety


Docusate Sodium (Docusate Sodium), 100 MG PO DAILY PRN for Constipation


Hydroxyzine HCl (Hydroxyzine HCl), 1 CAP DAILY PRN for Anxiety


Ibuprofen Tab (Advil), 600 MG PO Q6 PRN for Pain


Ondasetron Odt (Zofran Odt), 4 MG SL Q12 PRN for Nausea





Allergies


Coded Allergies:  


     Tramadol (Verified  Allergy, Unknown, SEIZURE, 17)


     Promethazine (Verified  Adverse Reaction, Mild, JITTERY, 17)


 PER PATIENT, GETS JITTERY. NOT ALLERGY





Physical Exam


Vital Signs











  Date Time  Temp Pulse Resp B/P (MAP) Pulse Ox O2 Delivery O2 Flow Rate FiO2


 


17 11:57     92 Mask 6.0 


 


17 11:13  91 18 108/61 92 Nasal Cannula 5.0 


 


17 10:13     91 Nasal Cannula 4.0 


 


17 10:10 37.5 92 22 125/52 93 Nasal Cannula 3.0 


 


17 09:19  84 16  91 Nasal Cannula 4.0 


 


17 08:33     85 Room Air  


 


17 08:32  91      


 


17 08:25     97 Room Air  


 


17 08:25     85 Room Air  


 


17 08:05 38.5 108 24 149/93 86 Room Air  











Physical Exam


GENERAL: Awake, alert, well-appearing, in no distress


HENT: Normocephalic, atraumatic. Dry mucous membranes.


EYES: Normal conjunctiva. Sclera non-icteric.


NECK: Supple. No nuchal rigidity. FROM. No JVD.


RESPIRATORY: Labored breathing with accessory muscle use. Scattered wheezes 

throughout with diminished breath sounds throughout.


CARDIAC: Regular rate, normal rhythm. Extremities warm and well perfused. 

Pulses equal.


ABDOMEN: Soft, slightly distended. No tenderness to palpation. No rebound or 

guarding. No masses.


RECTAL: Deferred.


MUSCULOSKELETAL: Chest examination reveals no tenderness. The back is 

symmetrical on inspection without obvious abnormality. There is no CVA 

tenderness to palpation. No joint edema. 


LOWER EXTREMITIES: Calves are equal size bilaterally and non-tender. Scant 

edema. No discoloration. 


NEURO: Normal sensorium. No sensory or motor deficits noted. 


SKIN: No rash or jaundice noted.





Medical Decision & Procedures


ER Provider


Diagnostic Interpretation:


Radiology results as stated below per my review and radiologist interpretation: 





CHEST ONE VIEW PORTABLE





FINDINGS:


Cardiomediastinal silhouette normal.  Vague patchy opacities in the right mid


and lung base suggested. No effusion or pneumothorax. Osseous structures normal.


Upper abdomen normal.





IMPRESSION:


1.  Suggestion of vague patchy opacity in the right mid and lung base. Further


confirmation with PA and lateral views of the chest could be obtained as this


raises concern for pneumonia.





Electronically signed by:  Lamin Sanchez M.D.


2017 8:43 AM





Dictated Date/Time:  2017 8:40 AM





(CHEST FOR PE) ANGIO WITH





FINDINGS:





 topogram: Unremarkable.





Pulmonary vasculature:





The study is adequate for assessment of the pulmonary vascular tree. No filling


defect within the pulmonary arteries to suggest embolus. Main pulmonary artery


is not enlarged. No flattening of the interventricular septum. No intracardiac


intracardiac filling defect. No reflux of contrast into the hepatic veins.





Remaining chest:





On soft tissue windows, normal thyroid and thoracic inlet. Prominent mediastinal


lymph nodes, increased in size since the prior exam. An index node in the


prevascular region now measures 12 mm in short axis, previously 9 mm. Increased


prominence of precarinal and subcarinal lymph nodes as well as bilateral hilar


lymph nodes. Normal aorta. Normal heart size. No pericardial or pleural


effusion. The spleen is mildly enlarged, similar to prior exam.





On lung windows, patchy groundglass opacities have increased from prior exam,


which affects the upper lobes to the greatest degree. More solid consolidation


and centrilobular nodularity noted at the left lung base. No significant septal


thickening. No fissural nodularity. Airways patent.





On bone windows, normal osseous structures.





IMPRESSION:


1.  No evidence of pulmonary embolus.





2.  Interval increase in patchy bilateral groundglass opacities affecting the


upper lobes to the greatest degree. To be a chronic lacking in weaning process.


Given the presence of increasing mediastinal and hilar lymphadenopathy,


sarcoidosis is a diagnostic consideration. Other differential etiologies include


pneumocystis pneumonia, chronic eosinophilic pneumonia, and hypersensitivity


pneumonitis.





3.  Superimposed centrilobular nodularity and more solid consolidation at the


left lung base, which raises concern for bronchopneumonia. However, this could


also be another manifestation of the more global pulmonary disease.





Electronically signed by:  Lamin Sanchez M.D.


2017 11:26 AM





Dictated Date/Time:  2017 11:15 AM





Laboratory Results


17 08:54








Red Blood Count 4.07, Mean Corpuscular Volume 90.7, Mean Corpuscular Hemoglobin 

30.2, Mean Corpuscular Hemoglobin Concent 33.3, Mean Platelet Volume 10.2, 

Neutrophils (%) (Auto) 78.2, Lymphocytes (%) (Auto) 12.8, Monocytes (%) (Auto) 

5.5, Eosinophils (%) (Auto) 2.2, Basophils (%) (Auto) 0.3, Neutrophils # (Auto) 

7.60, Lymphocytes # (Auto) 1.24, Monocytes # (Auto) 0.53, Eosinophils # (Auto) 

0.21, Basophils # (Auto) 0.03





17 08:54

















Test


  17


08:54 17


08:59 17


09:23 17


09:30


 


White Blood Count


  9.71 K/uL


(4.8-10.8) 


  


  


 


 


Red Blood Count


  4.07 M/uL


(4.2-5.4) 


  


  


 


 


Hemoglobin


  12.3 g/dL


(12.0-16.0) 


  


  


 


 


Hematocrit 36.9 % (37-47)    


 


Mean Corpuscular Volume


  90.7 fL


() 


  


  


 


 


Mean Corpuscular Hemoglobin


  30.2 pg


(25-34) 


  


  


 


 


Mean Corpuscular Hemoglobin


Concent 33.3 g/dl


(32-36) 


  


  


 


 


Platelet Count


  230 K/uL


(130-400) 


  


  


 


 


Mean Platelet Volume


  10.2 fL


(7.4-10.4) 


  


  


 


 


Neutrophils (%) (Auto) 78.2 %    


 


Lymphocytes (%) (Auto) 12.8 %    


 


Monocytes (%) (Auto) 5.5 %    


 


Eosinophils (%) (Auto) 2.2 %    


 


Basophils (%) (Auto) 0.3 %    


 


Neutrophils # (Auto)


  7.60 K/uL


(1.4-6.5) 


  


  


 


 


Lymphocytes # (Auto)


  1.24 K/uL


(1.2-3.4) 


  


  


 


 


Monocytes # (Auto)


  0.53 K/uL


(0.11-0.59) 


  


  


 


 


Eosinophils # (Auto)


  0.21 K/uL


(0-0.5) 


  


  


 


 


Basophils # (Auto)


  0.03 K/uL


(0-0.2) 


  


  


 


 


RDW Standard Deviation


  51.5 fL


(36.4-46.3) 


  


  


 


 


RDW Coefficient of Variation


  15.6 %


(11.5-14.5) 


  


  


 


 


Immature Granulocyte % (Auto) 1.0 %    


 


Immature Granulocyte # (Auto)


  0.10 K/uL


(0.00-0.02) 


  


  


 


 


Anion Gap


  7.0 mmol/L


(3-11) 


  


  


 


 


Est Creatinine Clear Calc


Drug Dose 120.2 ml/min 


  


  


  


 


 


Estimated GFR (


American) 118.4 


  


  


  


 


 


Estimated GFR (Non-


American 102.1 


  


  


  


 


 


BUN/Creatinine Ratio 12.5 (10-20)    


 


Calcium Level


  9.2 mg/dl


(8.5-10.1) 


  


  


 


 


Total Bilirubin


  0.4 mg/dl


(0.2-1) 


  


  


 


 


Direct Bilirubin


  0.2 mg/dl


(0-0.2) 


  


  


 


 


Aspartate Amino Transf


(AST/SGOT) 37 U/L (15-37) 


  


  


  


 


 


Alanine Aminotransferase


(ALT/SGPT) 28 U/L (12-78) 


  


  


  


 


 


Alkaline Phosphatase


  163 U/L


() 


  


  


 


 


Troponin I


  < 0.015 ng/ml


(0-0.045) 


  


  


 


 


Pro-B-Type Natriuretic Peptide


  594 pg/ml


(0-450) 


  


  


 


 


Total Protein


  7.6 gm/dl


(6.4-8.2) 


  


  


 


 


Albumin


  3.3 gm/dl


(3.4-5.0) 


  


  


 


 


Lipase


  85 U/L


() 


  


  


 


 


Bedside Lactic Acid Venous


  


  0.73 mmol/L


(0.90-1.70) 


  


 


 


Venous Blood pH


  


  


  7.43


(7.36-7.41) 


 


 


Venous Blood Partial Pressure


CO2 


  


  38 mmHg


(38.0-50.0) 


 


 


Venous Blood Partial Pressure


O2 


  


  68 mmHg 


  


 


 


Venous Blood HCO3   25 mmol/L  


 


Venous Blood Oxygen Saturation   92.2 %  


 


Venous Blood Base Excess   0.9 mEq/L  


 


Lactic Acid Level


  


  


  


  0.8 mmol/L


(0.4-2.0)





Laboratory results reviewed by me





Medications Administered











 Medications


  (Trade)  Dose


 Ordered  Sig/Anibal


 Route  Start Time


 Stop Time Status Last Admin


Dose Admin


 


 Albuterol/


 Ipratropium


  (Duoneb)  12 ml  ONE  ONCE


 INH  17 08:30


 17 08:31 DC 17 09:18


12 ML


 


 Methylprednisolone


 Sodium Succinate


  (Solu-Medrol IV)  125 mg  NOW  STAT


 IV  17 08:18


 17 08:26 DC 17 09:03


125 MG


 


 Magnesium Sulfate


  (Magnesium


 Sulfate)  2 gm  NOW  STAT


 IV  17 08:18


 17 08:26 DC 17 09:04


2 GM


 


 Azithromycin 500


 mg/Dextrose  255 ml @ 


 125 mls/hr  ONE  ONCE


 IV  17 08:30


 17 10:32 DC 17 09:07


125 MLS/HR


 


 Acetaminophen


  (Tylenol Tab)  1,000 mg  NOW  STAT


 PO  17 08:18


 17 08:26 DC 17 09:03


1,000 MG


 


 Sodium Chloride  1,000 ml @ 


 999 mls/hr  Q1H1M STAT


 IV  17 09:52


 17 10:52 DC 17 10:09


999 MLS/HR


 


 Ketorolac


 Tromethamine


  (Toradol Inj)  30 mg  NOW  STAT


 IV  17 09:52


 17 09:56 DC 17 10:07


30 MG


 


 Ceftriaxone


 Sodium 2000 mg/


 Dextrose  70 ml @ 


 100 mls/hr  ONE  STAT


 IV  17 09:52


 17 10:33 DC 17 10:23


100 MLS/HR


 


 Oxycodone/


 Acetaminophen


  (Percocet


 5-325mg Tab)  1 tab  NOW  ONCE


 PO  17 11:45


 17 11:46 DC 17 11:44


1 TAB











ECG


Indication:  SOB/dyspnea


Rate (beats per minute):  86


Rhythm:  normal sinus


Findings:  no acute ischemic change, other (normal axis, short UT)





ED Course


813: The patient was evaluated in room B10. A complete history and physical 

exam was performed.





0818: Magnesium Sulfate 2gm IV, Solu-Medrol IV 125mg IV. 





0830: Azithromycin 500mg/Dextrose 255ml @ 125mls/hr IV, Duoneb 12ml INH. 





0952: Ceftriaxone Sodium 2000mg/Dextrose 70ml @ 100mls/hr IV, Toradol Inj 30mg 

IV, Sodium Chloride 1000 ml @ 999 mls/hr IV. 





1000: Azithromycin 500mg/Dextrose IV. 





1036: I reevaluated and updated the patient on her results. 





1101: Discussed the patient's case with Dr. Dacosta [facility]. The patient 

will be evaluated for further treatment and disposition.





1112: Upon reexamination, the patient was doing well. I discussed the test 

results and treatment plan with her. The patient will be evaluated for further 

management.





Medical Decision


I reviewed the patient's past medical history, medications, and the nursing 

notes as described above.





Differential diagnosis includes pneumonia, bronchitis, COPD exacerbation, ACS, 

CHF, PE, sepsis.





Patient is a 47-year-old woman with a past medical history recurrent bronchitis/

pneumonia emergency department with worsening cough congestion and shortness of 

breath.  History of present illness.  The patient appears in mild distress, 

dyspneic, with O2 sat on room air in the mid 80s.  Exam the patient has diffuse 

wheezing throughout with scattered rhonchi at the bases and poor air flow.  

Patient was continuous DuoNeb, Solu-Medrol, magnesium, azithromycin on arrival 

or likely worsening bronchitis.  X-ray showing right lung field infiltrate 

concerning for pneumonia.  Therefore patient's coverage was broadened with 

ceftriaxone for severe CAP coverage.  Labs otherwise unremarkable with WBC and 

lactate within normal limits.  EKG unremarkable and troponin negative. 

Therefore broad-spectrum antibiotics not indicated at this time.  The patient 

continued to report pleuritic chest pain in the setting of her hypoxia CT PE 

study was ordered.  Case was discussed with medicine hospitalist who will admit 

the patient for further management. 


Of note, CT negative for PE or showing groundglass opacities since prior CT 

scan in .





Medication Reconcilliation


Current Medication List:  was personally reviewed by me





Blood Pressure Screening


Patient's blood pressure:  Elevated blood pressure


Blood pressure disposition:  Elevated BP felt to be situational





Consults


Time Called:  1040


Consulting Physician:  Dr. Yair Antony


Returned Call:  [phone]


Discussed the patient's case with Dr. Mazariegos of [facility]. The patient will be 

evaluated for further treatment and disposition.





Impression





 Primary Impression:  


 Pneumonia





Scribe Attestation


The scribe's documentation has been prepared under my direction and personally 

reviewed by me in its entirety. I confirm that the note above accurately 

reflects all work, treatment, procedures, and medical decision making performed 

by me.





Departure Information


Dispostion


Being Evaluated By Hospitalist





Referrals


No Doctor, Assigned (PCP)





Patient Instructions


My [facility]

## 2017-09-17 NOTE — HISTORY AND PHYSICAL
History & Physical


Date & Time of Service:


Sep 17, 2017 at 12:46


Chief Complaint:


Sob, Chest Congestion, Fever


Primary Care Physician:


No Doctor, Assigned


History of Present Illness


Source:  patient, family (sister), clinic records, hospital records


47 year old female with PMH of anemia, peptic ulcer with hemorrhage, 

fibromyalgia, Drug addiction on methadone, Depression presents to the Emergency 

Room with complaints of worsening dyspnea. Pt said that she has been having 

worsening SOB for  the past week that is worst on exertion. she saw her pcp 1 

week ago and was giving Augmentin with no help. she said that she has been 

using her albuterol inhaler with no help. She said that she checked her oxygen 

saturation and it was btw 84 to 88%. She also has been having fever and chills. 

she checked her temp at room and it was in the 102. Pt said that she has been 

treated for PNA 4 times since January. She states that she is a smoker and has 

been smoking less about 1/2 pack a day since developing dyspnea. She said that 

she has been having a dry coughing. She also complaints of chest tenderness 

that is associated with the cough. She also has been having nausea, abdominal 

discomfort associated with bloating. Denies any sick contact or recent travel. 

Denies any palpitation, dizziness, diarrhea.





Past Medical/Surgical History


Medical Problems:


(1) Chronic pain syndrome


Status: Chronic  





(2) Diabetes mellitus type 2


Status: Chronic  





(3) dyslipidemia


Status: Chronic  





(4) endometrial cryoablation


Status: Chronic  





(5) History of -  section


Status: Resolved  





(6) History of - tubal ligation


Status: Resolved  





(7) History of cholecystectomy


Status: Resolved  





(8) Nasal polyp


Status: Resolved  





(9) Ulcer


Status: Resolved  





Surgical Problems:


(1) Hx of cholecystectomy


Status: Resolved  








Family History





Cancer


Diabetes mellitus


Gallbladder disease


Heart disease


Hypertension


Kidney stones





Social History


Smoking Status:  Current Every Day Smoker


Marital Status:  single


Housing status:  lives with family


Occupational Status:  other





Immunizations


History of Influenza Vaccine:  N/A


History of Tetanus Vaccine?:  UTD


History of Pneumococcal:  Yes


History of Hepatitis B Vaccine:  Yes





Allergies


Coded Allergies:  


     Tramadol (Verified  Allergy, Unknown, SEIZURE, 17)


     Promethazine (Verified  Adverse Reaction, Mild, JITTERY, 17)


 PER PATIENT, GETS JITTERY. NOT ALLERGY





Home Medications


Scheduled


Amphetamine-Dextroamphetamine 30MG (Adderall 30MG), 10 MG PO BID


Gabapentin (Gabapentin), 300 MG PO AFTERNOON


Gabapentin (Gabapentin), 300 MG PO QAM


Gabapentin (Neurontin), 600 MG PO HS


Methadone Hcl (Methadone Hcl), 149 MG PO QAM


Omeprazole (Prilosec), 20 MG PO BID


Venlafaxine Hcl (Effexor Extended Rel), 75 MG PO DAILY


Venlafaxine Hcl (Effexor Extended Rel), 150 MG PO DAILY





Scheduled PRN


Clonazepam (Klonopin), 1 MG PO Q6 PRN for Anxiety


Docusate Sodium (Docusate Sodium), 100 MG PO DAILY PRN for Constipation


Hydroxyzine HCl (Hydroxyzine HCl), 1 CAP DAILY PRN for Anxiety


Ibuprofen Tab (Advil), 600 MG PO Q6 PRN for Pain


Ondasetron Odt (Zofran Odt), 4 MG SL Q12 PRN for Nausea





Review of Systems


Constitutional:  + fever, + chills, + weakness


Eyes:  No discharge


ENT:  No hearing loss


Respiratory:  + cough, + shortness of breath, + dyspnea on exertion


Cardiovascular:  No claudication, No palpitations


Abdomen:  + nausea


Musculoskeletal:  No calf pain


Genitourinary - Female:  No hematuria


Neurologic:  No memory loss, No paralysis


Psychiatric:  + anxiety


Endocrine:  + fatigue


Hematologic / Lymphatic:  + abnormal bleeding/bruising


Integumentary:  No rash, No itch





Physical Exam


Vital Signs











  Date Time  Temp Pulse Resp B/P (MAP) Pulse Ox O2 Delivery O2 Flow Rate FiO2


 


17 11:57     92 Mask 6.0 


 


17 11:13  91 18 108/61 92 Nasal Cannula 5.0 


 


17 10:13     91 Nasal Cannula 4.0 


 


17 10:10 37.5 92 22 125/52 93 Nasal Cannula 3.0 


 


17 09:19  84 16  91 Nasal Cannula 4.0 


 


17 08:33     85 Room Air  


 


17 08:32  91      


 


17 08:25     97 Room Air  


 


17 08:25     85 Room Air  


 


17 08:05 38.5 108 24 149/93 86 Room Air  








General Appearance:  WD/WN, no apparent distress


Head:  normocephalic, atraumatic


Eyes:  PERRL, EOMI


ENT:  hearing grossly normal


Neck:  supple, no JVD


Respiratory/Chest:  no respiratory distress, no accessory muscle use


Cardiovascular:  no JVD, + tachycardia


Abdomen/GI:  normal bowel sounds


Back:  no CVA tenderness


Extremities/Musculoskelatal:  no calf tenderness


Neurologic/Psych:  alert, oriented x 3


Skin:  warm/dry, no rash





Diagnostics


Laboratory Results





Results Past 24 Hours








Test


  17


08:54 17


08:59 17


09:23 17


09:30 Range/Units


 


 


White Blood Count 9.71    4.8-10.8  K/uL


 


Red Blood Count 4.07    4.2-5.4  M/uL


 


Hemoglobin 12.3    12.0-16.0  g/dL


 


Hematocrit 36.9    37-47  %


 


Mean Corpuscular Volume 90.7      fL


 


Mean Corpuscular Hemoglobin 30.2    25-34  pg


 


Mean Corpuscular Hemoglobin


Concent 33.3


  


  


  


  32-36  g/dl


 


 


Platelet Count 230    130-400  K/uL


 


Mean Platelet Volume 10.2    7.4-10.4  fL


 


Neutrophils (%) (Auto) 78.2     %


 


Lymphocytes (%) (Auto) 12.8     %


 


Monocytes (%) (Auto) 5.5     %


 


Eosinophils (%) (Auto) 2.2     %


 


Basophils (%) (Auto) 0.3     %


 


Neutrophils # (Auto) 7.60    1.4-6.5  K/uL


 


Lymphocytes # (Auto) 1.24    1.2-3.4  K/uL


 


Monocytes # (Auto) 0.53    0.11-0.59  K/uL


 


Eosinophils # (Auto) 0.21    0-0.5  K/uL


 


Basophils # (Auto) 0.03    0-0.2  K/uL


 


RDW Standard Deviation 51.5    36.4-46.3  fL


 


RDW Coefficient of Variation 15.6    11.5-14.5  %


 


Immature Granulocyte % (Auto) 1.0     %


 


Immature Granulocyte # (Auto) 0.10    0.00-0.02  K/uL


 


Sodium Level 136    136-145  mmol/L


 


Potassium Level 3.8    3.5-5.1  mmol/L


 


Chloride Level 103      mmol/L


 


Carbon Dioxide Level 26    21-32  mmol/L


 


Anion Gap 7.0    3-11  mmol/L


 


Blood Urea Nitrogen 9    7-18  mg/dl


 


Creatinine


  0.71


  


  


  


  0.60-1.20


mg/dl


 


Est Creatinine Clear Calc


Drug Dose 120.2


  


  


  


   ml/min


 


 


Estimated GFR (


American) 118.4


  


  


  


   


 


 


Estimated GFR (Non-


American 102.1


  


  


  


   


 


 


BUN/Creatinine Ratio 12.5    10-20  


 


Random Glucose 120    70-99  mg/dl


 


Calcium Level 9.2    8.5-10.1  mg/dl


 


Total Bilirubin 0.4    0.2-1  mg/dl


 


Direct Bilirubin 0.2    0-0.2  mg/dl


 


Aspartate Amino Transf


(AST/SGOT) 37


  


  


  


  15-37  U/L


 


 


Alanine Aminotransferase


(ALT/SGPT) 28


  


  


  


  12-78  U/L


 


 


Alkaline Phosphatase 163      U/L


 


Troponin I < 0.015    0-0.045  ng/ml


 


Pro-B-Type Natriuretic Peptide 594    0-450  pg/ml


 


Total Protein 7.6    6.4-8.2  gm/dl


 


Albumin 3.3    3.4-5.0  gm/dl


 


Lipase 85      U/L


 


Bedside Lactic Acid Venous


  


  0.73


  


  


  0.90-1.70


mmol/L


 


Venous Blood pH   7.43  7.36-7.41  


 


Venous Blood Partial Pressure


CO2 


  


  38


  


  38.0-50.0  mmHg


 


 


Venous Blood Partial Pressure


O2 


  


  68


  


   mmHg


 


 


Venous Blood HCO3   25   mmol/L


 


Venous Blood Oxygen Saturation   92.2   %


 


Venous Blood Base Excess   0.9   mEq/L


 


Lactic Acid Level    0.8 0.4-2.0  mmol/L








Microbiology Results


17 Blood Culture, Received


          Pending


17 Blood Culture, Received


          Pending





Diagnostic Radiology


(CHEST FOR PE) ANGIO WITH





CLINICAL HISTORY: 47 years-old Female presenting with hypoxia, chest congestion.








TECHNIQUE: Multidetector CT angiography of the chest was performed after


administration of intravenous contrast. 3-D volumetric and/or maximum intensity


projection (MIP) images were subsequently reconstructed for review. IV contrast:


78 mL of Optiray 320. A dose lowering technique was used consistent with the


principles of ALARA (as low as reasonably achievable).





COMPARISON: None.





CT DOSE (mGy.cm): The estimated cumulative dose is 499.20 mGy.cm.





FINDINGS:





 topogram: Unremarkable.





Pulmonary vasculature:





The study is adequate for assessment of the pulmonary vascular tree. No filling


defect within the pulmonary arteries to suggest embolus. Main pulmonary artery


is not enlarged. No flattening of the interventricular septum. No intracardiac


intracardiac filling defect. No reflux of contrast into the hepatic veins.





Remaining chest:





On soft tissue windows, normal thyroid and thoracic inlet. Prominent mediastinal


lymph nodes, increased in size since the prior exam. An index node in the


prevascular region now measures 12 mm in short axis, previously 9 mm. Increased


prominence of precarinal and subcarinal lymph nodes as well as bilateral hilar


lymph nodes. Normal aorta. Normal heart size. No pericardial or pleural


effusion. The spleen is mildly enlarged, similar to prior exam.





On lung windows, patchy groundglass opacities have increased from prior exam,


which affects the upper lobes to the greatest degree. More solid consolidation


and centrilobular nodularity noted at the left lung base. No significant septal


thickening. No fissural nodularity. Airways patent.





On bone windows, normal osseous structures.





IMPRESSION:


1.  No evidence of pulmonary embolus.





2.  Interval increase in patchy bilateral groundglass opacities affecting the


upper lobes to the greatest degree. To be a chronic lacking in weaning process.


Given the presence of increasing mediastinal and hilar lymphadenopathy,


sarcoidosis is a diagnostic consideration. Other differential etiologies include


pneumocystis pneumonia, chronic eosinophilic pneumonia, and hypersensitivity


pneumonitis.





3.  Superimposed centrilobular nodularity and more solid consolidation at the


left lung base, which raises concern for bronchopneumonia. However, this could


also be another manifestation of the more global pulmonary disease.











Electronically signed by:  Lamin Sanchez M.D.


2017 11:26 AM





Dictated Date/Time:  2017 11:15 AM








CHEST ONE VIEW PORTABLE





CLINICAL HISTORY: 47 years-old Female presenting with CHEST PAIN. 





TECHNIQUE: Portable upright AP view of the chest was obtained.





COMPARISON:  2017.





FINDINGS:


Cardiomediastinal silhouette normal.  Vague patchy opacities in the right mid


and lung base suggested. No effusion or pneumothorax. Osseous structures normal.


Upper abdomen normal.





IMPRESSION:


1.  Suggestion of vague patchy opacity in the right mid and lung base. Further


confirmation with PA and lateral views of the chest could be obtained as this


raises concern for pneumonia.











Electronically signed by:  Lamin Sanchez M.D.


2017 8:43 AM





Dictated Date/Time:  2017 8:40 AM





Impression


Assessment and Plan


Dyspnea /Hypoxia


Possible related to Pneumonia


Has been having recurrent pneumonia


CXR showed vague patchy opacity in the right mid and lung base. 


CTA chest showed no evidence of PE. Interval increase in patchy bilateral 

groundglass opacities affecting the upper lobes to the greatest degree


Received Rocephin and zithromax, and solumedrol  in the ER


Will continue rocephin and zitromax


starting on prednisone 40mg


Duoneb treatment


blood cx, urine legionella spending





Mediastinal and Hilar lymphadenopathy


will need to r/o sarcoidosis


Pulmonary consulted


will check for angiotensin enzymes





Hx of drug abuse


Follow with the methadone clinic


PDMP reviewed showed last refill for Adderall was in March 


Also klonopin was refilled on  for 15 tabs daily


Pt said that she was prescribed it q6h and she was taking the Adderall BID





Fibromyalgia


On gabapentin


On Ibuprofen at home, will consider Tylenol due to hx of peptic ulcer 





Elevated BP


Possible situational


Will monitor BP





Anxiety


On klonopin and effexor





Hx Peptic ulcer with hemorrhage


Stable





DVT px on SCDs due to prior history of peptic ulcer bleeding about 1 yr ago





CODE STATUS Full code





Level of Care


Med/Surg





Advanced Directives


Existing Living Will:  No


Existing Power of :  No





Resuscitation Status


FULL RESUSCITATION





VTE Prophylaxis


VTE Risk Assessment Done? Y/N:  Yes


Risk Level:  Moderate


Given or contraindicated:  SCD's
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
## 2017-09-17 NOTE — DIAGNOSTIC IMAGING REPORT
CHEST ONE VIEW PORTABLE



CLINICAL HISTORY: 47 years-old Female presenting with CHEST PAIN. 



TECHNIQUE: Portable upright AP view of the chest was obtained.



COMPARISON:  9/8/2017.



FINDINGS:

Cardiomediastinal silhouette normal.  Vague patchy opacities in the right mid

and lung base suggested. No effusion or pneumothorax. Osseous structures normal.

Upper abdomen normal.



IMPRESSION:

1.  Suggestion of vague patchy opacity in the right mid and lung base. Further

confirmation with PA and lateral views of the chest could be obtained as this

raises concern for pneumonia.







Electronically signed by:  Lamin Sanchez M.D.

9/17/2017 8:43 AM



Dictated Date/Time:  9/17/2017 8:40 AM

## 2017-09-18 VITALS
DIASTOLIC BLOOD PRESSURE: 86 MMHG | TEMPERATURE: 97.88 F | HEART RATE: 65 BPM | OXYGEN SATURATION: 97 % | SYSTOLIC BLOOD PRESSURE: 116 MMHG

## 2017-09-18 VITALS
SYSTOLIC BLOOD PRESSURE: 115 MMHG | HEART RATE: 84 BPM | TEMPERATURE: 98.42 F | DIASTOLIC BLOOD PRESSURE: 72 MMHG | OXYGEN SATURATION: 95 %

## 2017-09-18 VITALS
HEART RATE: 59 BPM | DIASTOLIC BLOOD PRESSURE: 67 MMHG | TEMPERATURE: 98.06 F | OXYGEN SATURATION: 94 % | SYSTOLIC BLOOD PRESSURE: 112 MMHG

## 2017-09-18 VITALS — HEART RATE: 85 BPM | OXYGEN SATURATION: 91 %

## 2017-09-18 VITALS — HEART RATE: 66 BPM | OXYGEN SATURATION: 96 %

## 2017-09-18 VITALS — OXYGEN SATURATION: 95 % | HEART RATE: 66 BPM

## 2017-09-18 VITALS — OXYGEN SATURATION: 93 % | HEART RATE: 64 BPM

## 2017-09-18 VITALS — OXYGEN SATURATION: 94 %

## 2017-09-18 LAB
ANION GAP SERPL CALC-SCNC: 6 MMOL/L (ref 3–11)
BUN SERPL-MCNC: 10 MG/DL (ref 7–18)
BUN/CREAT SERPL: 14.2 (ref 10–20)
CALCIUM SERPL-MCNC: 9.2 MG/DL (ref 8.5–10.1)
CHLORIDE SERPL-SCNC: 106 MMOL/L (ref 98–107)
CO2 SERPL-SCNC: 28 MMOL/L (ref 21–32)
CREAT CL PREDICTED SERPL C-G-VRATE: 120.2 ML/MIN
CREAT SERPL-MCNC: 0.71 MG/DL (ref 0.6–1.2)
EOSINOPHIL NFR BLD AUTO: 216 K/UL (ref 130–400)
GLUCOSE SERPL-MCNC: 140 MG/DL (ref 70–99)
HCT VFR BLD CALC: 34.9 % (ref 37–47)
MCH RBC QN AUTO: 29.5 PG (ref 25–34)
MCHC RBC AUTO-ENTMCNC: 32.1 G/DL (ref 32–36)
MCV RBC AUTO: 91.8 FL (ref 80–100)
PMV BLD AUTO: 10.1 FL (ref 7.4–10.4)
POTASSIUM SERPL-SCNC: 3.7 MMOL/L (ref 3.5–5.1)
RBC # BLD AUTO: 3.8 M/UL (ref 4.2–5.4)
SODIUM SERPL-SCNC: 140 MMOL/L (ref 136–145)
WBC # BLD AUTO: 7.97 K/UL (ref 4.8–10.8)

## 2017-09-18 RX ADMIN — IPRATROPIUM BROMIDE AND ALBUTEROL SULFATE SCH ML: .5; 3 SOLUTION RESPIRATORY (INHALATION) at 07:18

## 2017-09-18 RX ADMIN — CLONAZEPAM PRN MG: 1 TABLET ORAL at 14:00

## 2017-09-18 RX ADMIN — GUAIFENESIN AND CODEINE PHOSPHATE PRN ML: 100; 10 SOLUTION ORAL at 21:02

## 2017-09-18 RX ADMIN — GABAPENTIN SCH MG: 300 CAPSULE ORAL at 08:16

## 2017-09-18 RX ADMIN — GABAPENTIN SCH MG: 300 CAPSULE ORAL at 21:03

## 2017-09-18 RX ADMIN — LANSOPRAZOLE SCH MG: 15 TABLET, ORALLY DISINTEGRATING, DELAYED RELEASE ORAL at 08:16

## 2017-09-18 RX ADMIN — IBUPROFEN PRN MG: 600 TABLET, FILM COATED ORAL at 08:17

## 2017-09-18 RX ADMIN — LANSOPRAZOLE SCH MG: 15 TABLET, ORALLY DISINTEGRATING, DELAYED RELEASE ORAL at 21:03

## 2017-09-18 RX ADMIN — IPRATROPIUM BROMIDE AND ALBUTEROL SULFATE SCH ML: .5; 3 SOLUTION RESPIRATORY (INHALATION) at 11:02

## 2017-09-18 RX ADMIN — VENLAFAXINE HYDROCHLORIDE SCH MG: 150 CAPSULE, EXTENDED RELEASE ORAL at 08:16

## 2017-09-18 RX ADMIN — SODIUM CHLORIDE PRN MG: 9 INJECTION INTRAMUSCULAR; INTRAVENOUS; SUBCUTANEOUS at 21:00

## 2017-09-18 RX ADMIN — CEFTRIAXONE SODIUM SCH MLS/HR: 1 INJECTION, POWDER, FOR SOLUTION INTRAVENOUS at 10:51

## 2017-09-18 RX ADMIN — IBUPROFEN PRN MG: 600 TABLET, FILM COATED ORAL at 19:01

## 2017-09-18 RX ADMIN — GABAPENTIN SCH MG: 300 CAPSULE ORAL at 14:00

## 2017-09-18 RX ADMIN — AZITHROMYCIN MONOHYDRATE SCH MLS/HR: 500 INJECTION, POWDER, LYOPHILIZED, FOR SOLUTION INTRAVENOUS at 08:16

## 2017-09-18 RX ADMIN — IPRATROPIUM BROMIDE AND ALBUTEROL SULFATE SCH ML: .5; 3 SOLUTION RESPIRATORY (INHALATION) at 20:06

## 2017-09-18 RX ADMIN — METHADONE HYDROCHLORIDE SCH MG: 10 SOLUTION ORAL at 08:17

## 2017-09-18 RX ADMIN — IPRATROPIUM BROMIDE AND ALBUTEROL SULFATE SCH ML: .5; 3 SOLUTION RESPIRATORY (INHALATION) at 15:23

## 2017-09-18 RX ADMIN — VENLAFAXINE HYDROCHLORIDE SCH MG: 75 CAPSULE, EXTENDED RELEASE ORAL at 08:16

## 2017-09-18 NOTE — PULMONOLOGY PROGRESS NOTE
Pulmonary Progress Note


Date of Service


Sep 18, 2017.





Attending


Dr. Caceres





Subjective


Patient is not feeling better today. She actually is feeling slightly worse. 

She states that she is having a left-sided back pain today which is slightly 

worse with deep breath. She continues to have SOB and on and off nagging/dry 

cough. 





Labs reviewed 9/18:


WBC 7.97


Hgb 11.2


ACE pending


Creatinine 0.71/ BUN 10





ABG yesterday:


pH 7.43


pCO2 38


pO2 68


HCO3 25


O2 Sat 92.2





Blood cultures pending


Patient continues on IV Ceftriaxone and Azithromycin





CT of Chest showed increase in patchy b/l ground glass opacities alfreda. in upper 

lobes. Images viewed. Increased hilar and mediastinal lymphadenopathy as well. 

Solid consolidation seen at left lung base as well which raised concern for 

superimposed PNA as well. 





Other systems reviewed and negative except as mentioned above.





Objective


CT Chest 9/17:





IMPRESSION:


1.  No evidence of pulmonary embolus.





2.  Interval increase in patchy bilateral groundglass opacities affecting the


upper lobes to the greatest degree. To be a chronic lacking in weaning process.


Given the presence of increasing mediastinal and hilar lymphadenopathy,


sarcoidosis is a diagnostic consideration. Other differential etiologies include


pneumocystis pneumonia, chronic eosinophilic pneumonia, and hypersensitivity


pneumonitis.





3.  Superimposed centrilobular nodularity and more solid consolidation at the


left lung base, which raises concern for bronchopneumonia. However, this could


also be another manifestation of the more global pulmonary disease.











VS reviewed 9/18:


Temp 36.7 C HR 59-84


RR 12-18


//72


SaO2 93-96%. Currently on 1.5 L via nasal cannula





General: Patient is awake, alert, cooperative, and in no acute distress. Well 

developed.  Well-nourished.


Head: Normocephalic, Atraumatic.


ENT: PERRLA, No discharge, EOMI, Sclera normal


Neck: Normal ROM. Trachea midline. No stridor


Respiratory: Mild coarse breath sounds heard at left base. Otherwise relatively 

clear to auscultation. No respiratory distress. Currently with 1.5 L via nasal 

cannula


Cardiovascular: Regular rate and rhythm. No murmur appreciate. Normal S1/S2. 


Abdomen: Nontender to palpation. Normal bowel sounds hear throughout. No 

guarding. Abdomen is soft and nontender


Back: Normal inspection. 


Extremities: No edema, cyanosis. Normal ROM


Neuro: Alert, Oriented x 3. CN II-XII grossly intact. Sensation and motor 

function grossly intact.


Psych: Mood and affect are normal. 


Pulses: Dorsal pedal b/l 2+





Assessment & Plan


Multifocal pneumonia superimposed on UNK chronic lung disease


Hypoxia


Smoker (light, 4 years)





-Patient continues to have SOB, cough, and pain with deep inspiration. She is 

agreeable to having a surgical lung bx as an outpatient following current 

admission for definitive dx of chronic lung disease- possible sarcoidosis. ACE 

level pending.


-Blood cultures pending. Continue Ceftriaxone and Azithromycin pending further 

improvement.


-O2 requirement has decreased. Now on 1.5 L via nasal cannula. Continue to 

supplement O2 PRN to keep SaO2 >88%.


-Continue Duonub as scheduled and PRN SOB.


-Continue Prednisone 40 mg. Will begin taper after improvement is seen. 


-If improvement is not seen over the next 1-2 days, may need to consider 

bronchoscopy. 





Pulm will continue to follow





Patient and plan reviewed and agree with current assessment.





Data


Medications:





Current Inpatient Medications








 Medications


  (Trade)  Dose


 Ordered  Sig/Anibal


 Route  Start Time


 Stop Time Status Last Admin


Dose Admin


 


 Ioversol


  (Optiray 320)  100 ml  UD  PRN


 IV  9/17/17 10:45


 9/21/17 10:44   


 


 


 Ceftriaxone


 Sodium 1 gm/


 Dextrose  50 ml @ 


 100 mls/hr  Q24H


 IV  9/18/17 10:00


 9/24/17 09:59  9/18/17 10:51


100 MLS/HR


 


 Azithromycin 500


 mg/Dextrose  255 ml @ 


 125 mls/hr  DAILY


 IV  9/18/17 09:00


 9/24/17 08:59  9/18/17 08:16


125 MLS/HR


 


 Prednisone


  (PredniSONE TAB)  40 mg  DAILY


 PO  9/18/17 09:00


 10/18/17 08:59  9/18/17 08:16


40 MG


 


 Albuterol/


 Ipratropium


  (Duoneb)  3 ml  QIDR


 INH  9/17/17 16:00


 10/17/17 15:59  9/18/17 15:23


3 ML


 


 Docusate Sodium


  (coLACE CAP)  100 mg  DAILY  PRN


 PO  9/17/17 12:45


 10/17/17 12:44   


 


 


 Gabapentin


  (Neurontin Cap)  300 mg  DAILY@1400


 PO  9/18/17 14:00


 10/18/17 13:59  9/18/17 14:00


300 MG


 


 Gabapentin


  (Neurontin Cap)  300 mg  QAM


 PO  9/18/17 09:00


 10/18/17 08:59  9/18/17 08:16


300 MG


 


 Gabapentin


  (Neurontin Cap)  600 mg  HS


 PO  9/17/17 21:00


 10/17/17 20:59  9/17/17 20:52


600 MG


 


 Venlafaxine HCl


  (effeXOR


 EXTENDED REL CAP)  75 mg  DAILY


 PO  9/18/17 09:00


 10/18/17 08:59  9/18/17 08:16


75 MG


 


 Venlafaxine HCl


  (effeXOR


 EXTENDED REL CAP)  150 mg  DAILY


 PO  9/18/17 09:00


 10/18/17 08:59  9/18/17 08:16


150 MG


 


 Methadone HCl


  (Methadone HCl)  149 mg  QAM


 PO  9/18/17 09:00


 10/2/17 08:59 Future hold 9/18/17 08:17


149 MG


 


 Clonazepam


  (Klonopin Tab)  1 mg  DAILY  PRN


 PO  9/17/17 12:45


 10/17/17 12:44  9/18/17 14:00


1 MG


 


 Ondansetron HCl


  (Zofran Inj)  4 mg  Q6H  PRN


 IV  9/17/17 12:45


 10/17/17 12:44   


 


 


 Non-Formulary


 Medication


  (Patient'S Own


 Controlled Med)  1 ea  QAM  PRN


 N/A  9/18/17 09:00


 10/2/17 08:59 Future hold 9/18/17 08:17


1 EA


 


 Lansoprazole


  (Prevacid


 Solutab)  15 mg  BID


 PO  9/17/17 21:00


 10/17/17 20:59  9/18/17 08:16


15 MG


 


 Ibuprofen


  (Motrin Tab)  600 mg  TID  PRN


 PO  9/17/17 18:45


 10/17/17 18:44  9/18/17 08:17


600 MG








Vital Signs:











  Date Time  Temp Pulse Resp B/P (MAP) Pulse Ox O2 Delivery O2 Flow Rate FiO2


 


9/18/17 15:25 36.7 59 16 112/67 (82) 94  1.5 


 


9/18/17 15:23  66 14  95 Nasal Cannula 1.5 


 


9/18/17 11:03  64 14  93 Nasal Cannula 1.5 


 


9/18/17 08:00      Nasal Cannula 1.0 


 


9/18/17 07:25 36.9 84 18 115/72 (86) 95   


 


9/18/17 07:18  66 12  96 Nasal Cannula 5.0 


 


9/18/17 00:00      Nasal Cannula 6.0 


 


9/17/17 23:49 36.6 69 20 118/74 (89) 94 Nasal Cannula 6.0 


 


9/17/17 20:00      Nasal Cannula 6.0 


 


9/17/17 19:51  68 16  94 Nasal Cannula 5.0 








Laboratory Results:





Last 24 Hours








Test


  9/18/17


05:36


 


White Blood Count 7.97 K/uL 


 


Red Blood Count 3.80 M/uL 


 


Hemoglobin 11.2 g/dL 


 


Hematocrit 34.9 % 


 


Mean Corpuscular Volume 91.8 fL 


 


Mean Corpuscular Hemoglobin 29.5 pg 


 


Mean Corpuscular Hemoglobin


Concent 32.1 g/dl 


 


 


RDW Standard Deviation 52.7 fL 


 


RDW Coefficient of Variation 15.7 % 


 


Platelet Count 216 K/uL 


 


Mean Platelet Volume 10.1 fL 


 


Sodium Level 140 mmol/L 


 


Potassium Level 3.7 mmol/L 


 


Chloride Level 106 mmol/L 


 


Carbon Dioxide Level 28 mmol/L 


 


Anion Gap 6.0 mmol/L 


 


Blood Urea Nitrogen 10 mg/dl 


 


Creatinine 0.71 mg/dl 


 


Est Creatinine Clear Calc


Drug Dose 120.2 ml/min 


 


 


Estimated GFR (


American) 118.4 


 


 


Estimated GFR (Non-


American 102.1 


 


 


BUN/Creatinine Ratio 14.2 


 


Random Glucose 140 mg/dl 


 


Calcium Level 9.2 mg/dl

## 2017-09-18 NOTE — PROGRESS NOTE
Medicine Progress Note


Date & Time of Visit:


Sep 18, 2017 at 19:17.


Subjective


Pt was seen and examined 


Lying in bed with no distress


Pt said that she is having tenderness around her left side of her rib that 

radiating to her back


she said that pain worsening with coughing and deep breathing


She said that she becomes shortness of breath with minimal exertion


Denies any chest pain and palpitation





Objective





Last 8 Hrs








  Date Time  Temp Pulse Resp B/P (MAP) Pulse Ox O2 Delivery O2 Flow Rate FiO2


 


9/18/17 16:00     94 Nasal Cannula 1.5 


 


9/18/17 15:25 36.7 59 16 112/67 (82) 94  1.5 


 


9/18/17 15:23  66 14  95 Nasal Cannula 1.5 








Physical Exam:


General- No acute distress


Head-  atraumatic


Eyes- PERRL, EOMI


ENT- oropharynx clear


Neck- supple, no JVD


Lungs- Coarse breath sound


Heart- regular rhythm


Abdomen- normal bowel sounds


Extremities- no calf tenderness


Neuro- alert, oriented x 3; PERRL, EOMI


Skin- warm & dry


Laboratory Results:





Last 24 Hours








Test


  9/18/17


05:36 9/18/17


16:50 9/18/17


17:56


 


White Blood Count 7.97 K/uL   


 


Red Blood Count 3.80 M/uL   


 


Hemoglobin 11.2 g/dL   


 


Hematocrit 34.9 %   


 


Mean Corpuscular Volume 91.8 fL   


 


Mean Corpuscular Hemoglobin 29.5 pg   


 


Mean Corpuscular Hemoglobin


Concent 32.1 g/dl 


  


  


 


 


RDW Standard Deviation 52.7 fL   


 


RDW Coefficient of Variation 15.7 %   


 


Platelet Count 216 K/uL   


 


Mean Platelet Volume 10.1 fL   


 


Sodium Level 140 mmol/L   


 


Potassium Level 3.7 mmol/L   


 


Chloride Level 106 mmol/L   


 


Carbon Dioxide Level 28 mmol/L   


 


Anion Gap 6.0 mmol/L   


 


Blood Urea Nitrogen 10 mg/dl   


 


Creatinine 0.71 mg/dl   


 


Est Creatinine Clear Calc


Drug Dose 120.2 ml/min 


  


  


 


 


Estimated GFR (


American) 118.4 


  


  


 


 


Estimated GFR (Non-


American 102.1 


  


  


 


 


BUN/Creatinine Ratio 14.2   


 


Random Glucose 140 mg/dl   


 


Calcium Level 9.2 mg/dl   











Assessment & Plan


Dyspnea /Hypoxia


Secondary to Pneumonia


Has been having recurrent pneumonia


CXR showed vague patchy opacity in the right mid and lung base. 


CTA chest showed no evidence of PE. Interval increase in patchy bilateral 

groundglass opacities affecting the upper lobes to the greatest degree


Received Rocephin and zithromax, and solumedrol  in the ER


Continue rocephin and Zithromax


Continue prednisone 40mg


Duoneb treatment, will add guaifenesin for the cough 


blood cx and urine legionella, serum mycoplasma pending


Continue oxygen supplement


Pulmonary on board


If symptoms worsening, will consider a bronch as per pulmonary team








Mediastinal and Hilar lymphadenopathy


will need to r/o sarcoidosis


Pulmonary consulted


angiotensin converting enzymes pending


Will need a lung bx as an outpatient as per pulmonary





Hx of drug abuse


Follow with the methadone clinic


PDMP reviewed showed last refill for Adderall was in March 


Also klonopin was refilled on 8/17 for 15 tabs daily


Pt said that she was prescribed it q6h and she was taking the Adderall BID





Fibromyalgia


On gabapentin


On Ibuprofen at home, will consider Tylenol due to hx of peptic ulcer 





Elevated BP


Possible situational


Will monitor BP





Elevated blood glucose


Will check Hba1c





Anxiety


On klonopin and effexor


Pt was taking klonopin q6hr prn


Informed pt that her last script for Klonopin was ordered to take daily





Hx Peptic ulcer with hemorrhage


Stable





DVT px on SCDs due to prior history of peptic ulcer bleeding about 1 yr ago





CODE STATUS Full code


Consultants:


Pulmonary


Current Inpatient Medications:





Current Inpatient Medications








 Medications


  (Trade)  Dose


 Ordered  Sig/Anibal


 Route  Start Time


 Stop Time Status Last Admin


Dose Admin


 


 Ioversol


  (Optiray 320)  100 ml  UD  PRN


 IV  9/17/17 10:45


 9/21/17 10:44   


 


 


 Ceftriaxone


 Sodium 1 gm/


 Dextrose  50 ml @ 


 100 mls/hr  Q24H


 IV  9/18/17 10:00


 9/24/17 09:59  9/18/17 10:51


100 MLS/HR


 


 Azithromycin 500


 mg/Dextrose  255 ml @ 


 125 mls/hr  DAILY


 IV  9/18/17 09:00


 9/24/17 08:59  9/18/17 08:16


125 MLS/HR


 


 Prednisone


  (PredniSONE TAB)  40 mg  DAILY


 PO  9/18/17 09:00


 10/18/17 08:59  9/18/17 08:16


40 MG


 


 Albuterol/


 Ipratropium


  (Duoneb)  3 ml  QIDR


 INH  9/17/17 16:00


 10/17/17 15:59  9/18/17 15:23


3 ML


 


 Docusate Sodium


  (coLACE CAP)  100 mg  DAILY  PRN


 PO  9/17/17 12:45


 10/17/17 12:44   


 


 


 Gabapentin


  (Neurontin Cap)  300 mg  DAILY@1400


 PO  9/18/17 14:00


 10/18/17 13:59  9/18/17 14:00


300 MG


 


 Gabapentin


  (Neurontin Cap)  300 mg  QAM


 PO  9/18/17 09:00


 10/18/17 08:59  9/18/17 08:16


300 MG


 


 Gabapentin


  (Neurontin Cap)  600 mg  HS


 PO  9/17/17 21:00


 10/17/17 20:59  9/17/17 20:52


600 MG


 


 Venlafaxine HCl


  (effeXOR


 EXTENDED REL CAP)  75 mg  DAILY


 PO  9/18/17 09:00


 10/18/17 08:59  9/18/17 08:16


75 MG


 


 Venlafaxine HCl


  (effeXOR


 EXTENDED REL CAP)  150 mg  DAILY


 PO  9/18/17 09:00


 10/18/17 08:59  9/18/17 08:16


150 MG


 


 Methadone HCl


  (Methadone HCl)  149 mg  QAM


 PO  9/18/17 09:00


 10/2/17 08:59 Future hold 9/18/17 08:17


149 MG


 


 Clonazepam


  (Klonopin Tab)  1 mg  DAILY  PRN


 PO  9/17/17 12:45


 10/17/17 12:44  9/18/17 14:00


1 MG


 


 Ondansetron HCl


  (Zofran Inj)  4 mg  Q6H  PRN


 IV  9/17/17 12:45


 10/17/17 12:44   


 


 


 Non-Formulary


 Medication


  (Patient'S Own


 Controlled Med)  1 ea  QAM  PRN


 N/A  9/18/17 09:00


 10/2/17 08:59 Future hold 9/18/17 08:17


1 EA


 


 Lansoprazole


  (Prevacid


 Solutab)  15 mg  BID


 PO  9/17/17 21:00


 10/17/17 20:59  9/18/17 08:16


15 MG


 


 Ibuprofen


  (Motrin Tab)  600 mg  TID  PRN


 PO  9/17/17 18:45


 10/17/17 18:44  9/18/17 19:01


600 MG

## 2017-09-19 VITALS
HEART RATE: 90 BPM | DIASTOLIC BLOOD PRESSURE: 82 MMHG | OXYGEN SATURATION: 96 % | SYSTOLIC BLOOD PRESSURE: 132 MMHG | TEMPERATURE: 98.78 F

## 2017-09-19 VITALS — OXYGEN SATURATION: 96 %

## 2017-09-19 VITALS
SYSTOLIC BLOOD PRESSURE: 129 MMHG | TEMPERATURE: 98.24 F | OXYGEN SATURATION: 93 % | HEART RATE: 70 BPM | DIASTOLIC BLOOD PRESSURE: 71 MMHG

## 2017-09-19 VITALS — HEART RATE: 82 BPM | OXYGEN SATURATION: 93 %

## 2017-09-19 LAB
ANION GAP SERPL CALC-SCNC: 8 MMOL/L (ref 3–11)
BUN SERPL-MCNC: 15 MG/DL (ref 7–18)
BUN/CREAT SERPL: 19.3 (ref 10–20)
CALCIUM SERPL-MCNC: 8.9 MG/DL (ref 8.5–10.1)
CHLORIDE SERPL-SCNC: 107 MMOL/L (ref 98–107)
CO2 SERPL-SCNC: 26 MMOL/L (ref 21–32)
CREAT CL PREDICTED SERPL C-G-VRATE: 113.8 ML/MIN
CREAT SERPL-MCNC: 0.75 MG/DL (ref 0.6–1.2)
EOSINOPHIL NFR BLD AUTO: 248 K/UL (ref 130–400)
EST. AVERAGE GLUCOSE BLD GHB EST-MCNC: 123 MG/DL
GLUCOSE SERPL-MCNC: 127 MG/DL (ref 70–99)
HCT VFR BLD CALC: 35.1 % (ref 37–47)
MCH RBC QN AUTO: 28.8 PG (ref 25–34)
MCHC RBC AUTO-ENTMCNC: 31.1 G/DL (ref 32–36)
MCV RBC AUTO: 92.6 FL (ref 80–100)
PMV BLD AUTO: 10 FL (ref 7.4–10.4)
POTASSIUM SERPL-SCNC: 3.5 MMOL/L (ref 3.5–5.1)
RBC # BLD AUTO: 3.79 M/UL (ref 4.2–5.4)
SODIUM SERPL-SCNC: 141 MMOL/L (ref 136–145)
WBC # BLD AUTO: 8.56 K/UL (ref 4.8–10.8)

## 2017-09-19 RX ADMIN — LANSOPRAZOLE SCH MG: 15 TABLET, ORALLY DISINTEGRATING, DELAYED RELEASE ORAL at 21:03

## 2017-09-19 RX ADMIN — GUAIFENESIN AND CODEINE PHOSPHATE PRN ML: 100; 10 SOLUTION ORAL at 16:45

## 2017-09-19 RX ADMIN — VENLAFAXINE HYDROCHLORIDE SCH MG: 75 CAPSULE, EXTENDED RELEASE ORAL at 08:31

## 2017-09-19 RX ADMIN — IPRATROPIUM BROMIDE AND ALBUTEROL SULFATE SCH ML: .5; 3 SOLUTION RESPIRATORY (INHALATION) at 07:03

## 2017-09-19 RX ADMIN — LANSOPRAZOLE SCH MG: 15 TABLET, ORALLY DISINTEGRATING, DELAYED RELEASE ORAL at 08:31

## 2017-09-19 RX ADMIN — IPRATROPIUM BROMIDE AND ALBUTEROL SCH PUFFS: 20; 100 SPRAY, METERED RESPIRATORY (INHALATION) at 16:45

## 2017-09-19 RX ADMIN — GABAPENTIN SCH MG: 300 CAPSULE ORAL at 21:03

## 2017-09-19 RX ADMIN — GABAPENTIN SCH MG: 300 CAPSULE ORAL at 12:39

## 2017-09-19 RX ADMIN — IPRATROPIUM BROMIDE AND ALBUTEROL SCH PUFFS: 20; 100 SPRAY, METERED RESPIRATORY (INHALATION) at 21:02

## 2017-09-19 RX ADMIN — AZITHROMYCIN MONOHYDRATE SCH MLS/HR: 500 INJECTION, POWDER, LYOPHILIZED, FOR SOLUTION INTRAVENOUS at 08:33

## 2017-09-19 RX ADMIN — VENLAFAXINE HYDROCHLORIDE SCH MG: 150 CAPSULE, EXTENDED RELEASE ORAL at 08:31

## 2017-09-19 RX ADMIN — IBUPROFEN PRN MG: 600 TABLET, FILM COATED ORAL at 12:40

## 2017-09-19 RX ADMIN — IPRATROPIUM BROMIDE AND ALBUTEROL SCH PUFFS: 20; 100 SPRAY, METERED RESPIRATORY (INHALATION) at 12:39

## 2017-09-19 RX ADMIN — CEFTRIAXONE SODIUM SCH MLS/HR: 1 INJECTION, POWDER, FOR SOLUTION INTRAVENOUS at 08:32

## 2017-09-19 RX ADMIN — GABAPENTIN SCH MG: 300 CAPSULE ORAL at 08:31

## 2017-09-19 RX ADMIN — SODIUM CHLORIDE PRN MG: 9 INJECTION INTRAMUSCULAR; INTRAVENOUS; SUBCUTANEOUS at 16:50

## 2017-09-19 RX ADMIN — CLONAZEPAM PRN MG: 1 TABLET ORAL at 08:33

## 2017-09-19 RX ADMIN — IBUPROFEN PRN MG: 600 TABLET, FILM COATED ORAL at 19:43

## 2017-09-19 NOTE — PHARMACY PROGRESS NOTE
Automatic IV to PO Conversion


Date of Service:


Sep 19, 2017.


Scope


Pharmacy has identified patient as an appropriate candidate for automatic 

intravenous to oral conversion.


Eligible medication: Azithromycin 500mg IV every 24 hours. Day  # 2 of IV 

therapy.





Subjective


The patient is a 47 year old female admitted on Sep 17, 2017 at 12:12 for Fever,

Sob.





Objective


Vital Signs:





Vital Signs Past 12 Hours








  Date Time  Temp Pulse Resp B/P (MAP) Pulse Ox O2 Delivery O2 Flow Rate FiO2


 


9/19/17 08:00      Nasal Cannula 1.5 


 


9/19/17 07:54 36.8 70 16 129/71 (90) 93   


 


9/19/17 07:04  82 18  93 Room Air  


 


9/19/17 00:00      Nasal Cannula 1.5 


 


9/18/17 23:05 36.6 65 18 116/86 (96) 97 Room Air  








White Blood Count:











Test


  9/19/17


05:46


 


White Blood Count


  8.56 K/uL


(4.8-10.8)








Height (Feet):  5


Height (Inches):  8.00


Weight (Kilograms):  96.400


Type of Diet:  Regular





Assessment & Plan


The Infectious Disease Society and the American Thor[employer] Society recommend 

conversion to oral therapy once a patient is determined to be clinically stable 

and are able to tolerate oral medications. Patient identified as appropriate 

candidate for IV to PO conversion of Azithromycin 500mg daily based on the 

following criteria:





*  Afebrile for greater than or equal to 12 hours


* Receiving oral/enteral medications and/or tolerating oral/enteral diet for 

greater than 24 hours


* Improvement in clinical condition evidenced by .. WBC count of 8.56 10^3/uL 

and trending downward, resolution of signs/symptoms of illness


* Hemodynamically stable 





or





* Receiving oral medications and/or tolerating oral diet for greater than 24 

hours





Automatic conversion to: Azithromycin 500mg PO every 24 hours

## 2017-09-19 NOTE — PULMONOLOGY PROGRESS NOTE
Pulmonary Progress Note


Date of Service


Sep 19, 2017.





Attending


Dr. Caceres





Subjective


Patient notes she still continues to be short of breath and intermittent cough. 

She also complains of vague myalgias as well as arthralgias then notes left 

flank pain





Objective


47-year-old female with diabetes, hypertension who was admitted for shortness 

of breath with minimal smoking history about 0.5 PPD 4 years who is been noted 

to have progressive fatigue and dyspnea on exertion over the last 10 months 

notable abnormal CAT scans over that period of time. She's been treated with 

steroids and antibiotics with minimal overall relief.





VS:


I/O:   +480cc


RR:   16-18


SaO2:   93%


FIO2:   1.5L


RESP:   Decreased breath sounds otherwise clear to auscultation bilaterally


CARD:     S1-S2 regular rate and rhythm no murmurs or gallops


ABB:   Positive bowel sounds soft nontender


EXT:    1+ pitting edema bilaterally


Flank:   No flank pain to palpation





Studies:


WBC:   19V2G2W


PLT:   248


H/H:   11/35


BNP:   594


AO:   163 (9/17/17)


ALB:   3.3


VBG (9/17/17)    7.43/38








Microbiology:


   Blood x2 no growth to date   








Pending:


   ACE Level


   Legionella IgG


   Urine Legionella 


   Mycoplasma IgG & Gimp





CTA Thorax (9/17/17) compared to: 6/26/2017, 2/4/2016, and CT of the ABD/Pelvis 

(9/2/2015)


   Increased Diffuse GGO changes greatest in the Upper lobes bilaterally


   Mediastinal and hilar adenopathy 





Microbiology:


   Urine 10/20/2017: Lactobacillus species


   Urine 3/17/2008: Lactobacillus species


   Urine 12/29/2008: Lactobacillus bacillus species


   Urine 8/18/2015: Gardnerella


   Urine 5/4/2016: Chemistry up not enterococcus





Assessment & Plan


47-year-old female with progressive pulmonary filtrates and mediastinal 

lymphadenopathy for the last 10 months:





#1 Abnormal CAT scan: Patient has diffuse groundglass changes which have been 

consistent nature since February 2017. Also she has notable mediastinal 

lymphadenopathy associated with these changes. Clinically the patient is 

continued to be fatigued with diffuse myalgias over that timeframe as well. As 

the patient's CT changes seem to be more progressive I do believe that this 

time she wants more thorough investigation and she has agreed to perform nevus 

bronchoscopy with transbronchial biopsies. The OR has been informed and we are 

opening up the space tomorrow 9/20/2017. Patient is also agreed to perform a 

HIV study and I will send off for pre-albumin level and 24-hour urine calcium 

at this time.





Data


Medications:





Current Inpatient Medications








 Medications


  (Trade)  Dose


 Ordered  Sig/Anibal


 Route  Start Time


 Stop Time Status Last Admin


Dose Admin


 


 Ioversol


  (Optiray 320)  100 ml  UD  PRN


 IV  9/17/17 10:45


 9/21/17 10:44   


 


 


 Ceftriaxone


 Sodium 1 gm/


 Dextrose  50 ml @ 


 100 mls/hr  Q24H


 IV  9/18/17 10:00


 9/24/17 09:59  9/19/17 08:32


100 MLS/HR


 


 Prednisone


  (PredniSONE TAB)  40 mg  DAILY


 PO  9/18/17 09:00


 10/18/17 08:59  9/19/17 08:31


40 MG


 


 Docusate Sodium


  (coLACE CAP)  100 mg  DAILY  PRN


 PO  9/17/17 12:45


 10/17/17 12:44   


 


 


 Gabapentin


  (Neurontin Cap)  300 mg  DAILY@1400


 PO  9/18/17 14:00


 10/18/17 13:59  9/19/17 12:39


300 MG


 


 Gabapentin


  (Neurontin Cap)  300 mg  QAM


 PO  9/18/17 09:00


 10/18/17 08:59  9/19/17 08:31


300 MG


 


 Gabapentin


  (Neurontin Cap)  600 mg  HS


 PO  9/17/17 21:00


 10/17/17 20:59  9/18/17 21:03


600 MG


 


 Venlafaxine HCl


  (effeXOR


 EXTENDED REL CAP)  75 mg  DAILY


 PO  9/18/17 09:00


 10/18/17 08:59  9/19/17 08:31


75 MG


 


 Venlafaxine HCl


  (effeXOR


 EXTENDED REL CAP)  150 mg  DAILY


 PO  9/18/17 09:00


 10/18/17 08:59  9/19/17 08:31


150 MG


 


 Methadone HCl


  (Methadone HCl)  149 mg  QAM


 PO  9/18/17 09:00


 10/2/17 08:59 Future hold 9/18/17 08:17


149 MG


 


 Clonazepam


  (Klonopin Tab)  1 mg  DAILY  PRN


 PO  9/17/17 12:45


 10/17/17 12:44  9/19/17 08:33


1 MG


 


 Ondansetron HCl


  (Zofran Inj)  4 mg  Q6H  PRN


 IV  9/17/17 12:45


 10/17/17 12:44   


 


 


 Non-Formulary


 Medication


  (Patient'S Own


 Controlled Med)  1 ea  QAM  PRN


 N/A  9/18/17 09:00


 10/2/17 08:59 Future hold 9/18/17 08:17


1 EA


 


 Lansoprazole


  (Prevacid


 Solutab)  15 mg  BID


 PO  9/17/17 21:00


 10/17/17 20:59  9/19/17 08:31


15 MG


 


 Ibuprofen


  (Motrin Tab)  600 mg  TID  PRN


 PO  9/17/17 18:45


 10/17/17 18:44  9/19/17 12:40


600 MG


 


 Codeine Phosphate/


 Guaifenesin


  (Robitussin-AC


 Sugar Free Syrup)  10 ml  Q6H  PRN


 PO  9/18/17 19:45


 10/18/17 19:44  9/18/17 21:02


10 ML


 


 Ketorolac


 Tromethamine


  (Toradol Inj)  30 mg  Q6H  PRN


 IV  9/18/17 20:45


 9/23/17 20:44  9/18/17 21:00


30 MG


 


 Albuterol/


 Ipratropium


  (Combivent


 Respimat Inh)  1 puffs  QID


 INH  9/19/17 13:00


 10/19/17 12:59  9/19/17 12:39


1 PUFFS


 


 Azithromycin


  (Zithromax Tab)  500 mg  DAILY


 PO  9/20/17 09:00


 9/24/17 08:59   


 








Vital Signs:











  Date Time  Temp Pulse Resp B/P (MAP) Pulse Ox O2 Delivery O2 Flow Rate FiO2


 


9/19/17 08:00      Nasal Cannula 1.5 


 


9/19/17 07:54 36.8 70 16 129/71 (90) 93   


 


9/19/17 07:04  82 18  93 Room Air  


 


9/19/17 00:00      Nasal Cannula 1.5 


 


9/18/17 23:05 36.6 65 18 116/86 (96) 97 Room Air  


 


9/18/17 20:08  85 14  91 Room Air  


 


9/18/17 16:00     94 Nasal Cannula 1.5 


 


9/18/17 15:25 36.7 59 16 112/67 (82) 94  1.5 


 


9/18/17 15:23  66 14  95 Nasal Cannula 1.5 








Laboratory Results:





Last 24 Hours








Test


  9/18/17


16:50 9/19/17


05:46


 


White Blood Count  8.56 K/uL 


 


Red Blood Count  3.79 M/uL 


 


Hemoglobin  10.9 g/dL 


 


Hematocrit  35.1 % 


 


Mean Corpuscular Volume  92.6 fL 


 


Mean Corpuscular Hemoglobin  28.8 pg 


 


Mean Corpuscular Hemoglobin


Concent 


  31.1 g/dl 


 


 


RDW Standard Deviation  54.0 fL 


 


RDW Coefficient of Variation  16.1 % 


 


Platelet Count  248 K/uL 


 


Mean Platelet Volume  10.0 fL 


 


Sodium Level  141 mmol/L 


 


Potassium Level  3.5 mmol/L 


 


Chloride Level  107 mmol/L 


 


Carbon Dioxide Level  26 mmol/L 


 


Anion Gap  8.0 mmol/L 


 


Blood Urea Nitrogen  15 mg/dl 


 


Creatinine  0.75 mg/dl 


 


Est Creatinine Clear Calc


Drug Dose 


  113.8 ml/min 


 


 


Estimated GFR (


American) 


  110.8 


 


 


Estimated GFR (Non-


American 


  95.6 


 


 


BUN/Creatinine Ratio  19.3 


 


Random Glucose  127 mg/dl 


 


Estimated Average Glucose  123 mg/dl 


 


Hemoglobin A1c  5.9 % 


 


Calcium Level  8.9 mg/dl

## 2017-09-19 NOTE — ANESTHESIOLOGY PROGRESS NOTE
Anesthesia Progress Note


Date of Service


Sep 19, 2017.





Progress Notes


The patient is a 46 y/o female scheduled for EBUS tomorrow.  She has had SOB 

and chest congestion for the past week.  She check a pulse oximeter at home and 

found it to read in the 80s so she came to the hospital.  The patient does not 

have known COPD, however she uses albuterol since having multiple episodes of 

pneumonia in the past year.  She has been started on antibiotics for possible 

pneumonia and given Duonebs.  The EBUS is being performed to diagnose any other 

underlying lung disease.  Other PMH includes GERD, a peptic ulcer, fibromyalgia

, anemia, anxiety, depression, obesity, and migraines.  The patient smokes 0.5 

ppd x4 years.  The patient had a CXR that showed vague patchy opacities and 

possible R lung base pneumonia.  She had a chest CT that showed patchy 

bilateral ground glass opacities with possible L centrilobular pneumonia.  Her 

EKG shows sinus rhythm with short RI.  Labs are significant for hgb 10.9 and 

BNP of 594.  On exam the patient has good neck extension, is MP 2, and 

edentulous.  On auscultation her lungs have slight rhonchi in the bases 

bilaterally.  Heart is RRR.  Carotids are negative for bruits.  The patient was 

consented for general anesthesia.  She was counseled to remain NPO after 

midnight except for sips of water with pills.

## 2017-09-19 NOTE — PROGRESS NOTE
Medicine Progress Note


Date & Time of Visit:


Sep 19, 2017 at 19:37.


Subjective


Pt was seen and examined 


Sitting at the edge of the bed with no distress


Pt said that she feels slightly better


she said that the cough slightly improved 


continue to have pain when cough


denies any chest pain, palpitation and fever





Objective





Last 8 Hrs








  Date Time  Temp Pulse Resp B/P (MAP) Pulse Ox O2 Delivery O2 Flow Rate FiO2


 


9/19/17 15:34 37.1 90 18 132/82 (99) 96 Room Air  








Physical Exam:


General- No acute distress


Head-  atraumatic


Eyes- PERRL, EOMI


ENT- oropharynx clear


Neck- supple, no JVD


Lungs- No wheezing


Heart- regular rhythm


Abdomen- normal bowel sounds


Extremities- no calf tenderness


Neuro- alert, oriented x 3; PERRL, EOMI


Skin- warm & dry


Laboratory Results:





Last 24 Hours








Test


  9/19/17


05:46 9/19/17


14:29


 


White Blood Count 8.56 K/uL  


 


Red Blood Count 3.79 M/uL  


 


Hemoglobin 10.9 g/dL  


 


Hematocrit 35.1 %  


 


Mean Corpuscular Volume 92.6 fL  


 


Mean Corpuscular Hemoglobin 28.8 pg  


 


Mean Corpuscular Hemoglobin


Concent 31.1 g/dl 


  


 


 


RDW Standard Deviation 54.0 fL  


 


RDW Coefficient of Variation 16.1 %  


 


Platelet Count 248 K/uL  


 


Mean Platelet Volume 10.0 fL  


 


Sodium Level 141 mmol/L  


 


Potassium Level 3.5 mmol/L  


 


Chloride Level 107 mmol/L  


 


Carbon Dioxide Level 26 mmol/L  


 


Anion Gap 8.0 mmol/L  


 


Blood Urea Nitrogen 15 mg/dl  


 


Creatinine 0.75 mg/dl  


 


Est Creatinine Clear Calc


Drug Dose 113.8 ml/min 


  


 


 


Estimated GFR (


American) 110.8 


  


 


 


Estimated GFR (Non-


American 95.6 


  


 


 


BUN/Creatinine Ratio 19.3  


 


Random Glucose 127 mg/dl  


 


Estimated Average Glucose 123 mg/dl  


 


Hemoglobin A1c 5.9 %  


 


Calcium Level 8.9 mg/dl  











Assessment & Plan


Dyspnea /Hypoxia


Secondary to Pneumonia


Has been having recurrent pneumonia


CXR showed vague patchy opacity in the right mid and lung base. 


CTA chest showed no evidence of PE. Interval increase in patchy bilateral 

groundglass opacities affecting the upper lobes to the greatest degree


Received Rocephin and zithromax, and solumedrol  in the ER


Continue rocephin and Zithromax


Continue prednisone 40mg


Duoneb treatment, will add guaifenesin for the cough 


blood cx and urine legionella, serum mycoplasma pending


Continue oxygen supplement


Pulmonary on board


If symptoms worsening, will consider a bronch as per pulmonary team


9/19


Slightly improved


continue prednisone


case discussed with Dr. Caceres, plan to have bronch tomorrow


NPO after midnight


HIV test pending








Mediastinal and Hilar lymphadenopathy


will need to r/o sarcoidosis


Pulmonary consulted


angiotensin converting enzymes pending


schedule for bronch tomorrow





Bacteremia


Blood cx positive for gram positive cocci


will start on IV vanco 


Will consult ID


repeat blood cx tomorrow


consider echo


monitor CBC





Hx of drug abuse


Follow with the methadone clinic


PDMP reviewed showed last refill for Adderall was in March 


Also klonopin was refilled on 8/17 for 15 tabs daily


Pt said that she was prescribed it q6h and she was taking the Adderall BID





Fibromyalgia


On gabapentin


On Ibuprofen at home, will consider Tylenol due to hx of peptic ulcer 





Elevated BP


Possible situational


Will monitor BP


BP control





Elevated blood glucose


Hba1c 5.9


advised pt about Diet and losing weight





Tobacco abuse 


Counseling on smoking cessation





Anxiety


On klonopin and effexor


Pt was taking klonopin q6hr prn


Informed pt that her last script for Klonopin was ordered to take daily





Hx Peptic ulcer with hemorrhage


Stable





DVT px on SCDs due to prior history of peptic ulcer bleeding about 1 yr ago





CODE STATUS Full code





Disposition


Schedule for bronchoscopy tomorrow


Consultants:


Pulmonary


Current Inpatient Medications:





Current Inpatient Medications








 Medications


  (Trade)  Dose


 Ordered  Sig/Anibal


 Route  Start Time


 Stop Time Status Last Admin


Dose Admin


 


 Ioversol


  (Optiray 320)  100 ml  UD  PRN


 IV  9/17/17 10:45


 9/21/17 10:44   


 


 


 Ceftriaxone


 Sodium 1 gm/


 Dextrose  50 ml @ 


 100 mls/hr  Q24H


 IV  9/18/17 10:00


 9/24/17 09:59  9/19/17 08:32


100 MLS/HR


 


 Prednisone


  (PredniSONE TAB)  40 mg  DAILY


 PO  9/18/17 09:00


 10/18/17 08:59  9/19/17 08:31


40 MG


 


 Docusate Sodium


  (coLACE CAP)  100 mg  DAILY  PRN


 PO  9/17/17 12:45


 10/17/17 12:44   


 


 


 Gabapentin


  (Neurontin Cap)  300 mg  DAILY@1400


 PO  9/18/17 14:00


 10/18/17 13:59  9/19/17 12:39


300 MG


 


 Gabapentin


  (Neurontin Cap)  300 mg  QAM


 PO  9/18/17 09:00


 10/18/17 08:59  9/19/17 08:31


300 MG


 


 Gabapentin


  (Neurontin Cap)  600 mg  HS


 PO  9/17/17 21:00


 10/17/17 20:59  9/18/17 21:03


600 MG


 


 Venlafaxine HCl


  (effeXOR


 EXTENDED REL CAP)  75 mg  DAILY


 PO  9/18/17 09:00


 10/18/17 08:59  9/19/17 08:31


75 MG


 


 Venlafaxine HCl


  (effeXOR


 EXTENDED REL CAP)  150 mg  DAILY


 PO  9/18/17 09:00


 10/18/17 08:59  9/19/17 08:31


150 MG


 


 Methadone HCl


  (Methadone HCl)  149 mg  QAM


 PO  9/18/17 09:00


 10/2/17 08:59 Future hold 9/18/17 08:17


149 MG


 


 Clonazepam


  (Klonopin Tab)  1 mg  DAILY  PRN


 PO  9/17/17 12:45


 10/17/17 12:44  9/19/17 08:33


1 MG


 


 Ondansetron HCl


  (Zofran Inj)  4 mg  Q6H  PRN


 IV  9/17/17 12:45


 10/17/17 12:44   


 


 


 Non-Formulary


 Medication


  (Patient'S Own


 Controlled Med)  1 ea  QAM  PRN


 N/A  9/18/17 09:00


 10/2/17 08:59 Future hold 9/18/17 08:17


1 EA


 


 Lansoprazole


  (Prevacid


 Solutab)  15 mg  BID


 PO  9/17/17 21:00


 10/17/17 20:59  9/19/17 08:31


15 MG


 


 Ibuprofen


  (Motrin Tab)  600 mg  TID  PRN


 PO  9/17/17 18:45


 10/17/17 18:44  9/19/17 12:40


600 MG


 


 Codeine Phosphate/


 Guaifenesin


  (Robitussin-AC


 Sugar Free Syrup)  10 ml  Q6H  PRN


 PO  9/18/17 19:45


 10/18/17 19:44  9/19/17 16:45


10 ML


 


 Ketorolac


 Tromethamine


  (Toradol Inj)  30 mg  Q6H  PRN


 IV  9/18/17 20:45


 9/23/17 20:44  9/19/17 16:50


30 MG


 


 Albuterol/


 Ipratropium


  (Combivent


 Respimat Inh)  1 puffs  QID


 INH  9/19/17 13:00


 10/19/17 12:59  9/19/17 16:45


1 PUFFS


 


 Azithromycin


  (Zithromax Tab)  500 mg  DAILY


 PO  9/20/17 09:00


 9/24/17 08:59   


 


 


 Dextrose  1,000 ml @ 


 50 mls/hr  Q20H


 IV  9/20/17 00:00


 10/20/17 00:00   


 


 


 Hydromorphone HCl


  (Dilaudid Inj)  0.5 mg  Q5M  PRN


 IV  9/19/17 16:30


 9/19/17 21:30   


 


 


 Fentanyl Citrate


  (Fentanyl Inj)  25 mcg  Q5M  PRN


 IV  9/19/17 16:30


 9/19/17 21:30   


 


 


 Naloxone HCl


  (Narcan Inj)  0.2 mg  Q2M  PRN


 IV  9/19/17 16:30


 9/19/17 21:30   


 


 


 Meperidine HCl


  (Demerol Inj)  12.5 mg  Q5M  PRN


 IV  9/19/17 16:30


 9/19/17 21:30   


 


 


 Ondansetron HCl


  (Zofran Inj)  4 mg  ONE  PRN


 IV  9/19/17 16:30


 9/19/17 21:30   


 


 


 Flumazenil


  (Romazicon Inj)  0.2 mg  Q2M  PRN


 IV  9/19/17 16:30


 9/19/17 21:30   


 


 


 Labetalol HCl


  (Normodyne IV)  5 mg  Q5M  PRN


 IV  9/19/17 16:30


 9/19/17 21:30   


 


 


 Ephedrine Sulfate


  (EpHEDrine


 SULFATE INJ)  5 mg  Q5M  PRN


 IV  9/19/17 16:30


 9/19/17 21:30   


 


 


 Atropine Sulfate


  (Atropine


 Sulfate 0.1MG/Ml


 Inj)  0.5 mg  Q1M  PRN


 IV  9/19/17 16:30


 9/19/17 21:30   


 


 


 Phenylephrine HCl


  ([place]-Synephrine


 500MCG/5ML Syr)  100 mcg  Q5M  PRN


 IV  9/19/17 16:30


 9/19/17 21:30

## 2017-09-20 VITALS
SYSTOLIC BLOOD PRESSURE: 120 MMHG | DIASTOLIC BLOOD PRESSURE: 70 MMHG | OXYGEN SATURATION: 96 % | TEMPERATURE: 98.06 F | HEART RATE: 56 BPM

## 2017-09-20 VITALS
SYSTOLIC BLOOD PRESSURE: 122 MMHG | OXYGEN SATURATION: 95 % | TEMPERATURE: 97.52 F | HEART RATE: 69 BPM | DIASTOLIC BLOOD PRESSURE: 74 MMHG

## 2017-09-20 VITALS — HEART RATE: 63 BPM | SYSTOLIC BLOOD PRESSURE: 130 MMHG | TEMPERATURE: 98.06 F | DIASTOLIC BLOOD PRESSURE: 78 MMHG

## 2017-09-20 VITALS
DIASTOLIC BLOOD PRESSURE: 75 MMHG | OXYGEN SATURATION: 93 % | HEART RATE: 56 BPM | SYSTOLIC BLOOD PRESSURE: 123 MMHG | TEMPERATURE: 98.24 F

## 2017-09-20 VITALS
HEART RATE: 64 BPM | SYSTOLIC BLOOD PRESSURE: 115 MMHG | TEMPERATURE: 98.6 F | DIASTOLIC BLOOD PRESSURE: 71 MMHG | OXYGEN SATURATION: 93 %

## 2017-09-20 VITALS
SYSTOLIC BLOOD PRESSURE: 115 MMHG | OXYGEN SATURATION: 93 % | DIASTOLIC BLOOD PRESSURE: 71 MMHG | TEMPERATURE: 98.6 F | HEART RATE: 64 BPM

## 2017-09-20 VITALS — OXYGEN SATURATION: 92 %

## 2017-09-20 LAB
ANION GAP SERPL CALC-SCNC: 8 MMOL/L (ref 3–11)
BUN SERPL-MCNC: 13 MG/DL (ref 7–18)
BUN/CREAT SERPL: 15.2 (ref 10–20)
CALCIUM SERPL-MCNC: 9.1 MG/DL (ref 8.5–10.1)
CALCIUM UR-MCNC: < 5 MG/DL
CHLORIDE SERPL-SCNC: 105 MMOL/L (ref 98–107)
CO2 SERPL-SCNC: 26 MMOL/L (ref 21–32)
COLLECT DURATION TIME UR: 24 HOURS
CREAT CL PREDICTED SERPL C-G-VRATE: 104.1 ML/MIN
CREAT SERPL-MCNC: 0.82 MG/DL (ref 0.6–1.2)
EOSINOPHIL NFR BLD AUTO: 257 K/UL (ref 130–400)
GLUCOSE SERPL-MCNC: 120 MG/DL (ref 70–99)
HCT VFR BLD CALC: 34.8 % (ref 37–47)
INR PPP: 1 (ref 0.9–1.1)
MCH RBC QN AUTO: 29.9 PG (ref 25–34)
MCHC RBC AUTO-ENTMCNC: 33 G/DL (ref 32–36)
MCV RBC AUTO: 90.6 FL (ref 80–100)
PARTIAL THROMBOPLASTIN RATIO: 1
PMV BLD AUTO: 9.7 FL (ref 7.4–10.4)
POTASSIUM SERPL-SCNC: 3.4 MMOL/L (ref 3.5–5.1)
PROTHROMBIN TIME: 10.7 SECONDS (ref 9–12)
RBC # BLD AUTO: 3.84 M/UL (ref 4.2–5.4)
SODIUM SERPL-SCNC: 139 MMOL/L (ref 136–145)
WBC # BLD AUTO: 8.11 K/UL (ref 4.8–10.8)

## 2017-09-20 PROCEDURE — 0B9G8ZX DRAINAGE OF LEFT UPPER LUNG LOBE, VIA NATURAL OR ARTIFICIAL OPENING ENDOSCOPIC, DIAGNOSTIC: ICD-10-PCS | Performed by: INTERNAL MEDICINE

## 2017-09-20 RX ADMIN — GABAPENTIN SCH MG: 300 CAPSULE ORAL at 08:44

## 2017-09-20 RX ADMIN — CEFTRIAXONE SODIUM SCH MLS/HR: 1 INJECTION, POWDER, FOR SOLUTION INTRAVENOUS at 09:33

## 2017-09-20 RX ADMIN — LANSOPRAZOLE SCH MG: 15 TABLET, ORALLY DISINTEGRATING, DELAYED RELEASE ORAL at 20:46

## 2017-09-20 RX ADMIN — INSULIN ASPART SCH UNITS: 100 INJECTION, SOLUTION INTRAVENOUS; SUBCUTANEOUS at 18:57

## 2017-09-20 RX ADMIN — VENLAFAXINE HYDROCHLORIDE SCH MG: 75 CAPSULE, EXTENDED RELEASE ORAL at 08:45

## 2017-09-20 RX ADMIN — IPRATROPIUM BROMIDE AND ALBUTEROL SCH PUFFS: 20; 100 SPRAY, METERED RESPIRATORY (INHALATION) at 08:44

## 2017-09-20 RX ADMIN — VENLAFAXINE HYDROCHLORIDE SCH MG: 150 CAPSULE, EXTENDED RELEASE ORAL at 08:44

## 2017-09-20 RX ADMIN — GABAPENTIN SCH MG: 300 CAPSULE ORAL at 20:45

## 2017-09-20 RX ADMIN — LANSOPRAZOLE SCH MG: 15 TABLET, ORALLY DISINTEGRATING, DELAYED RELEASE ORAL at 08:44

## 2017-09-20 RX ADMIN — IPRATROPIUM BROMIDE AND ALBUTEROL SCH PUFFS: 20; 100 SPRAY, METERED RESPIRATORY (INHALATION) at 17:32

## 2017-09-20 RX ADMIN — GABAPENTIN SCH MG: 300 CAPSULE ORAL at 14:00

## 2017-09-20 RX ADMIN — METHADONE HYDROCHLORIDE SCH MG: 10 SOLUTION ORAL at 08:45

## 2017-09-20 RX ADMIN — IPRATROPIUM BROMIDE AND ALBUTEROL SCH PUFFS: 20; 100 SPRAY, METERED RESPIRATORY (INHALATION) at 12:25

## 2017-09-20 RX ADMIN — INSULIN ASPART SCH UNITS: 100 INJECTION, SOLUTION INTRAVENOUS; SUBCUTANEOUS at 20:20

## 2017-09-20 RX ADMIN — SODIUM CHLORIDE PRN MG: 9 INJECTION INTRAMUSCULAR; INTRAVENOUS; SUBCUTANEOUS at 06:41

## 2017-09-20 RX ADMIN — AZITHROMYCIN SCH MG: 250 TABLET, FILM COATED ORAL at 08:44

## 2017-09-20 RX ADMIN — SODIUM CHLORIDE PRN MG: 9 INJECTION INTRAMUSCULAR; INTRAVENOUS; SUBCUTANEOUS at 18:54

## 2017-09-20 RX ADMIN — IPRATROPIUM BROMIDE AND ALBUTEROL SCH PUFFS: 20; 100 SPRAY, METERED RESPIRATORY (INHALATION) at 20:44

## 2017-09-20 RX ADMIN — CLONAZEPAM PRN MG: 1 TABLET ORAL at 18:53

## 2017-09-20 RX ADMIN — GUAIFENESIN AND CODEINE PHOSPHATE PRN ML: 100; 10 SOLUTION ORAL at 18:53

## 2017-09-20 RX ADMIN — INSULIN ASPART SCH UNITS: 100 INJECTION, SOLUTION INTRAVENOUS; SUBCUTANEOUS at 20:49

## 2017-09-20 NOTE — PROGRESS NOTE
Medicine Progress Note


Date & Time of Visit:


Sep 20, 2017 at 11:41.


Subjective


Pt was seen and examined


Lying in bed with no distress


Pt said that her breathing is slightly better


Pt said that she is coughing less today


Denies any chest pain, palpitation and SOB





Objective





Last 8 Hrs








  Date Time  Temp Pulse Resp B/P (MAP) Pulse Ox O2 Delivery O2 Flow Rate FiO2


 


9/20/17 16:30 37.0 64 18 115/71 (86) 93 Room Air 10.0 


 


9/20/17 16:22 37.0 64 18 115/71 (86) 93 Room Air  


 


9/20/17 15:28 36.7 63 20 130/78 (95)  Room Air  


 


9/20/17 15:00 36.5 63 20 134/80 92 Room Air  


 


9/20/17 14:50  67 20 129/81 97 Oxymask 10 


 


9/20/17 14:40  70 20 104/71 97 Oxymask 10 


 


9/20/17 14:30 36.4 92 20 120/87 98 Oxymask 10 








Physical Exam:


General- No acute distress


Head-  atraumatic


Eyes- PERRL, EOMI


ENT- oropharynx clear


Neck- supple, no JVD


Lungs- No wheezing


Heart- regular rhythm


Abdomen- normal bowel sounds


Extremities- no calf tenderness


Neuro- alert, oriented x 3; PERRL, EOMI


Skin- warm & dry


Laboratory Results:





Last 24 Hours








Test


  9/20/17


07:51 9/20/17


14:52


 


White Blood Count 8.11 K/uL  


 


Red Blood Count 3.84 M/uL  


 


Hemoglobin 11.5 g/dL  


 


Hematocrit 34.8 %  


 


Mean Corpuscular Volume 90.6 fL  


 


Mean Corpuscular Hemoglobin 29.9 pg  


 


Mean Corpuscular Hemoglobin


Concent 33.0 g/dl 


  


 


 


RDW Standard Deviation 52.2 fL  


 


RDW Coefficient of Variation 15.6 %  


 


Platelet Count 257 K/uL  


 


Mean Platelet Volume 9.7 fL  


 


Prothrombin Time 10.7 SECONDS  


 


Prothromb Time International


Ratio 1.0 


  


 


 


Activated Partial


Thromboplast Time 26.6 SECONDS 


  


 


 


Partial Thromboplastin Ratio 1.0  


 


Sodium Level 139 mmol/L  


 


Potassium Level 3.4 mmol/L  


 


Chloride Level 105 mmol/L  


 


Carbon Dioxide Level 26 mmol/L  


 


Anion Gap 8.0 mmol/L  


 


Blood Urea Nitrogen 13 mg/dl  


 


Creatinine 0.82 mg/dl  


 


Est Creatinine Clear Calc


Drug Dose 104.1 ml/min 


  


 


 


Estimated GFR (


American) 99.5 


  


 


 


Estimated GFR (Non-


American 85.8 


  


 


 


BUN/Creatinine Ratio 15.2  


 


Random Glucose 120 mg/dl  


 


Calcium Level 9.1 mg/dl  


 


Prealbumin 27.4 mg/dl  


 


Procalcitonin < 0.05 ng/ml  


 


HIV (1&2) Ab and P24 Ag, 4th


Gener NEG 


  


 


 


Bedside Glucose  162 mg/dl 














 Date/Time


Source Procedure


Growth Status


 


 


 9/20/17 00:00


Bronchial Washings Left Upper Lobe Fungal Smear


Pending Received


 


 9/20/17 00:00


Bronchial Washings Left Upper Lobe Fungal Culture


Pending Received





 9/20/17 00:00


Bronchial Washings Left Upper Lobe Acid Fast Stain


Pending Received


 


 9/20/17 00:00


Bronchial Washings Left Upper Lobe Mycobacterial Culture


Pending Received





 9/20/17 00:00


Bronchial Washings Left Upper Lobe Gram Stain


Pending Received


 


 9/20/17 00:00


Bronchial Washings Left Upper Lobe Bronchoalveolar Lavage Culture


Pending Received











Assessment & Plan


Dyspnea /Hypoxia


Secondary to Pneumonia


Has been having recurrent pneumonia


CXR showed vague patchy opacity in the right mid and lung base. 


CTA chest showed no evidence of PE. Interval increase in patchy bilateral 

groundglass opacities affecting the upper lobes to the greatest degree


Received Rocephin and zithromax, and solumedrol  in the ER


Continue rocephin and Zithromax


Continue prednisone 40mg


Duoneb treatment, will add guaifenesin for the cough 


blood cx and urine legionella, serum mycoplasma pending


Continue oxygen supplement


Pulmonary on board


If symptoms worsening, will consider a bronch as per pulmonary team


9/20


Clinically improved


Prednisone taper


NPO for the bronch today


HIV ab negative








Mediastinal and Hilar lymphadenopathy


will need to r/o sarcoidosis


Pulmonary consulted


angiotensin converting enzymes pending


Bronch done today and tissue sent for bx


No complication





Bacteremia


Blood cx positive for gram positive cocci


will start on IV vanco 


Will consult ID


repeat blood cx tomorrow


consider echo


monitor CBC


9/20


Lab called to inform the positive blood cx was due to contamination


D/C vanco





Hx of drug abuse


Follow with the methadone clinic


PDMP reviewed showed last refill for Adderall was in March 


Also klonopin was refilled on 8/17 for 15 tabs daily


Pt said that she was prescribed it q6h and she was taking the Adderall BID





Fibromyalgia


On gabapentin


On Ibuprofen at home, will consider Tylenol due to hx of peptic ulcer 





Elevated BP


Possible situational


Will monitor BP


BP control





Elevated blood glucose


Hba1c 5.9


D/C D5 water


will start on a sliding scale because of the elevated BS from the steroid


advised pt about Diet and losing weight








Tobacco abuse 


Counseling on smoking cessation





Anxiety


On klonopin and effexor


Pt was taking klonopin q6hr prn


Informed pt that her last script for Klonopin was ordered to take daily





Hx Peptic ulcer with hemorrhage


Stable





DVT px on SCDs due to prior history of peptic ulcer bleeding about 1 yr ago





CODE STATUS Full code





Disposition


Possible d/c tomorrow


Consultants:


Pulmonary


Current Inpatient Medications:





Current Inpatient Medications








 Medications


  (Trade)  Dose


 Ordered  Sig/Anibal


 Route  Start Time


 Stop Time Status Last Admin


Dose Admin


 


 Ioversol


  (Optiray 320)  100 ml  UD  PRN


 IV  9/17/17 10:45


 9/21/17 10:44   


 


 


 Ceftriaxone


 Sodium 1 gm/


 Dextrose  50 ml @ 


 100 mls/hr  Q24H


 IV  9/18/17 10:00


 9/24/17 09:59  9/20/17 09:33


100 MLS/HR


 


 Prednisone


  (PredniSONE TAB)  40 mg  DAILY


 PO  9/18/17 09:00


 10/18/17 08:59  9/20/17 08:44


40 MG


 


 Docusate Sodium


  (coLACE CAP)  100 mg  DAILY  PRN


 PO  9/17/17 12:45


 10/17/17 12:44   


 


 


 Gabapentin


  (Neurontin Cap)  300 mg  DAILY@1400


 PO  9/18/17 14:00


 10/18/17 13:59  9/19/17 12:39


300 MG


 


 Gabapentin


  (Neurontin Cap)  300 mg  QAM


 PO  9/18/17 09:00


 10/18/17 08:59  9/20/17 08:44


300 MG


 


 Gabapentin


  (Neurontin Cap)  600 mg  HS


 PO  9/17/17 21:00


 10/17/17 20:59  9/19/17 21:03


600 MG


 


 Venlafaxine HCl


  (effeXOR


 EXTENDED REL CAP)  75 mg  DAILY


 PO  9/18/17 09:00


 10/18/17 08:59  9/20/17 08:45


75 MG


 


 Venlafaxine HCl


  (effeXOR


 EXTENDED REL CAP)  150 mg  DAILY


 PO  9/18/17 09:00


 10/18/17 08:59  9/20/17 08:44


150 MG


 


 Methadone HCl


  (Methadone HCl)  149 mg  QAM


 PO  9/18/17 09:00


 10/2/17 08:59 Future hold 9/20/17 08:45


149 MG


 


 Clonazepam


  (Klonopin Tab)  1 mg  DAILY  PRN


 PO  9/17/17 12:45


 10/17/17 12:44  9/19/17 08:33


1 MG


 


 Ondansetron HCl


  (Zofran Inj)  4 mg  Q6H  PRN


 IV  9/17/17 12:45


 10/17/17 12:44   


 


 


 Non-Formulary


 Medication


  (Patient'S Own


 Controlled Med)  1 ea  QAM  PRN


 N/A  9/18/17 09:00


 10/2/17 08:59 Future hold 9/18/17 08:17


1 EA


 


 Lansoprazole


  (Prevacid


 Solutab)  15 mg  BID


 PO  9/17/17 21:00


 10/17/17 20:59  9/20/17 08:44


15 MG


 


 Ibuprofen


  (Motrin Tab)  600 mg  TID  PRN


 PO  9/17/17 18:45


 10/17/17 18:44  9/19/17 19:43


600 MG


 


 Codeine Phosphate/


 Guaifenesin


  (Robitussin-AC


 Sugar Free Syrup)  10 ml  Q6H  PRN


 PO  9/18/17 19:45


 10/18/17 19:44  9/19/17 16:45


10 ML


 


 Ketorolac


 Tromethamine


  (Toradol Inj)  30 mg  Q6H  PRN


 IV  9/18/17 20:45


 9/23/17 20:44  9/20/17 06:41


30 MG


 


 Albuterol/


 Ipratropium


  (Combivent


 Respimat Inh)  1 puffs  QID


 INH  9/19/17 13:00


 10/19/17 12:59  9/20/17 17:32


1 PUFFS


 


 Azithromycin


  (Zithromax Tab)  500 mg  DAILY


 PO  9/20/17 09:00


 9/24/17 08:59  9/20/17 08:44


500 MG


 


 Dextrose  1,000 ml @ 


 50 mls/hr  Q20H


 IV  9/20/17 00:00


 10/20/17 00:00  9/20/17 00:00


50 MLS/HR


 


 Miscellaneous


 Information


  (Nursing Verbal


 Med Order)  1 ea  ONE  ONCE


 N/A  9/20/17 17:45


 9/20/17 17:46 UNV

## 2017-09-20 NOTE — MEDICAL CONSULT
Consultation


Date of Consultation:


Sep 20, 2017.


Attending Physician:


Marce Davison M.D.


Reason for Consultation:


Positive blood culture for gram-positive cocci


History of Present Illness


47-year-old female with history of fibromyalgia, peptic ulcer disease, chronic 

anemia, depression, who was admitted with 1 week history of progressively 

worsening cough, shortness of breath, generalized aches and pains, with low-

grade fever.  She states that she has had at least 4 separate bouts of 

pneumonia since January requiring antibiotic therapy.  She had rheumatologic 

workup in the past which was unremarkable.  She has had low IgA level 

documented in . She has now been found to have worsening infiltrates on 

chest x-ray along with worsening regional adenopathy, and bronchoscopy is 

planned for later today.  Blood cultures now reported, 1 bottle, positive for 

gram-positive cocci.  She denies any significant travel or exposure history.  

No pets.  I ordered HIV testing this morning which was negative.  Other 

serologic studies are pending.  Patient currently being treated with vancomycin

, azithromycin, ceftriaxone





Past Medical/Surgical History


Medical Problems:


(1) Anxiety


Status: Acute  





(2) Bilateral pneumonia


Status: Acute  





(3) Chest pain


Status: Acute  





(4) Chronic dental pain


Status: Acute  





(5) Fever


Status: Acute  





(6) Musculoskeletal pain


Status: Acute  





(7) Pneumonia


Status: Acute  





(8) Pneumonia


Status: Acute  





(9) Pneumonia


Status: Acute  





(10) Tobacco abuse


Status: Acute  





(11) Upper abdominal pain


Status: Acute  





Medical Problems:


(1) Chronic pain syndrome


(2) Diabetes mellitus type 2


(3) dyslipidemia


(4) endometrial cryoablation


(5) Fever


(6) History of -  section


(7) History of - tubal ligation


(8) History of cholecystectomy


(9) Nasal polyp


(10) Sepsis


(11) SOB (shortness of breath)


(12) Ulcer


Surgical Problems:


(1) Hx of cholecystectomy











Family History





Cancer


Diabetes mellitus


Gallbladder disease


Heart disease


Hypertension


Kidney stones





Social History


Smoking Status:  Current Every Day Smoker


Marital Status:  single


Occupation Status:  other





Allergies


Coded Allergies:  


     Tramadol (Verified  Allergy, Unknown, SEIZURE, 17)


     Promethazine (Verified  Adverse Reaction, Mild, JITTERY, 17)


 PER PATIENT, GETS JITTERY. NOT ALLERGY





Current Inpatient Medications





Current Inpatient Medications








 Medications


  (Trade)  Dose


 Ordered  Sig/Anibal


 Route  Start Time


 Stop Time Status Last Admin


Dose Admin


 


 Ioversol


  (Optiray 320)  100 ml  UD  PRN


 IV  17 10:45


 17 10:44   


 


 


 Ceftriaxone


 Sodium 1 gm/


 Dextrose  50 ml @ 


 100 mls/hr  Q24H


 IV  17 10:00


 17 09:59  17 09:33


100 MLS/HR


 


 Prednisone


  (PredniSONE TAB)  40 mg  DAILY


 PO  17 09:00


 10/18/17 08:59  17 08:44


40 MG


 


 Docusate Sodium


  (coLACE CAP)  100 mg  DAILY  PRN


 PO  17 12:45


 10/17/17 12:44   


 


 


 Gabapentin


  (Neurontin Cap)  300 mg  DAILY@1400


 PO  17 14:00


 10/18/17 13:59  17 12:39


300 MG


 


 Gabapentin


  (Neurontin Cap)  300 mg  QAM


 PO  17 09:00


 10/18/17 08:59  17 08:44


300 MG


 


 Gabapentin


  (Neurontin Cap)  600 mg  HS


 PO  17 21:00


 10/17/17 20:59  17 21:03


600 MG


 


 Venlafaxine HCl


  (effeXOR


 EXTENDED REL CAP)  75 mg  DAILY


 PO  17 09:00


 10/18/17 08:59  17 08:45


75 MG


 


 Venlafaxine HCl


  (effeXOR


 EXTENDED REL CAP)  150 mg  DAILY


 PO  17 09:00


 10/18/17 08:59  17 08:44


150 MG


 


 Methadone HCl


  (Methadone HCl)  149 mg  QAM


 PO  17 09:00


 10/2/17 08:59 Future hold 17 08:45


149 MG


 


 Clonazepam


  (Klonopin Tab)  1 mg  DAILY  PRN


 PO  17 12:45


 10/17/17 12:44  17 08:33


1 MG


 


 Ondansetron HCl


  (Zofran Inj)  4 mg  Q6H  PRN


 IV  17 12:45


 10/17/17 12:44   


 


 


 Non-Formulary


 Medication


  (Patient'S Own


 Controlled Med)  1 ea  QAM  PRN


 N/A  17 09:00


 10/2/17 08:59 Future hold 17 08:17


1 EA


 


 Lansoprazole


  (Prevacid


 Solutab)  15 mg  BID


 PO  17 21:00


 10/17/17 20:59  17 08:44


15 MG


 


 Ibuprofen


  (Motrin Tab)  600 mg  TID  PRN


 PO  17 18:45


 10/17/17 18:44  17 19:43


600 MG


 


 Codeine Phosphate/


 Guaifenesin


  (Robitussin-AC


 Sugar Free Syrup)  10 ml  Q6H  PRN


 PO  17 19:45


 10/18/17 19:44  17 16:45


10 ML


 


 Ketorolac


 Tromethamine


  (Toradol Inj)  30 mg  Q6H  PRN


 IV  17 20:45


 17 20:44  17 06:41


30 MG


 


 Albuterol/


 Ipratropium


  (Combivent


 Respimat Inh)  1 puffs  QID


 INH  17 13:00


 10/19/17 12:59  17 08:44


1 PUFFS


 


 Azithromycin


  (Zithromax Tab)  500 mg  DAILY


 PO  17 09:00


 17 08:59  17 08:44


500 MG


 


 Dextrose  1,000 ml @ 


 50 mls/hr  Q20H


 IV  17 00:00


 10/20/17 00:00  17 00:00


50 MLS/HR


 


 Vancomycin HCl


  (Consult)  1 ea  UD  PRN


 N/A  17 20:30


 10/19/17 20:29   


 


 


 Vancomycin HCl


 1500 mg/Sodium


 Chloride  530 ml @ 


 200 mls/hr  Q10H


 IV  17 06:00


 10/4/17 05:59  17 06:28


200 MLS/HR











Review of Systems


All systems were reviewed and are negative except as per HPI





Physical Exam











  Date Time  Temp Pulse Resp B/P (MAP) Pulse Ox O2 Delivery O2 Flow Rate FiO2


 


17 08:30      Room Air  


 


17 07:33 36.4 69 18 122/74 (90) 95   


 


17 00:08 36.8 56 18 123/75 (91) 93 Room Air  


 


17 00:00      Room Air  


 


17 16:00     96 Room Air 2.0 


 


17 15:34 37.1 90 18 132/82 (99) 96 Room Air  








General Appearance:  WD/WN, no apparent distress


Head:  normocephalic, atraumatic


Eyes:  normal inspection, sclerae normal


ENT:  normal ENT inspection, pharynx normal


Neck:  supple, no adenopathy, trachea midline


Respiratory/Chest:  chest non-tender, no respiratory distress, no accessory 

muscle use, + rales


Cardiovascular:  regular rate, rhythm, no gallop, no murmur


Abdomen/GI:  normal bowel sounds, non tender, soft, no organomegaly


Back:  normal inspection, no CVA tenderness


Extremities/Musculoskelatal:  normal inspection, no calf tenderness, non-tender


Neurologic/Psych:  alert, normal mood/affect, oriented x 3


Skin:  normal color, warm/dry, no rash


Lymphatic:  no adenopathy





Laboratory Results


---------------------------





RUN DATE: 17                [facility]             

    PAGE 1   


RUN TIME: 729                            Specimen Inquiry                    


--------------------------------------------------------------------------------

------------





PATIENT: BEAR JOSÉ               [id number]:  C.[facility]      U #

: [id number]


                                       AGE/SX: 47/F         ROOM: [facility]       REG

: 17


REG DR:  Marce Davison M.D.       :    1970   BED:  2          DIS

:         


                                       STATUS: ADM IN       TLOC:           


--------------------------------------------------------------------------------

------------








SPEC #: [id number]        GENA:      STATUS:  RES            REQ 

#: [id number]


                            RECD:      [facility] DR: Van Pompa M.D.     


SOURCE: BLOOD               ENTR:      [facility] DR: No Doctor, 

Assigned           


[facility]:                                                                        

            


ORDERED:  BLOOD CULTURE                                                        

   


--------------------------------------------------------------------------------

------------





  Procedure                         Result                         Verified    

       Site


--------------------------------------------------------------------------------

------------





  BLD CULT  Preliminary                                            


     Organism 1                     GRAM POSITIVE COCCI


        SENS                        SENSITIVITIES DEPENDENT ON FURTHER 

IDENTIFICATION





        Phoned Positive Blood Culture Gram Stain Report to DUNG MÉNDEZ on 17 At 1808 By KHARI. Results were verbalized


        back to KHARI.


        





--------------------------------------------------------------------------------

------------














Last 24 Hours








Test


  17


14:29 17


07:51


 


White Blood Count  8.11 K/uL 


 


Red Blood Count  3.84 M/uL 


 


Hemoglobin  11.5 g/dL 


 


Hematocrit  34.8 % 


 


Mean Corpuscular Volume  90.6 fL 


 


Mean Corpuscular Hemoglobin  29.9 pg 


 


Mean Corpuscular Hemoglobin


Concent 


  33.0 g/dl 


 


 


RDW Standard Deviation  52.2 fL 


 


RDW Coefficient of Variation  15.6 % 


 


Platelet Count  257 K/uL 


 


Mean Platelet Volume  9.7 fL 


 


Prothrombin Time  10.7 SECONDS 


 


Prothromb Time International


Ratio 


  1.0 


 


 


Activated Partial


Thromboplast Time 


  26.6 SECONDS 


 


 


Partial Thromboplastin Ratio  1.0 


 


Sodium Level  139 mmol/L 


 


Potassium Level  3.4 mmol/L 


 


Chloride Level  105 mmol/L 


 


Carbon Dioxide Level  26 mmol/L 


 


Anion Gap  8.0 mmol/L 


 


Blood Urea Nitrogen  13 mg/dl 


 


Creatinine  0.82 mg/dl 


 


Est Creatinine Clear Calc


Drug Dose 


  104.1 ml/min 


 


 


Estimated GFR (


American) 


  99.5 


 


 


Estimated GFR (Non-


American 


  85.8 


 


 


BUN/Creatinine Ratio  15.2 


 


Random Glucose  120 mg/dl 


 


Calcium Level  9.1 mg/dl 


 


Prealbumin  27.4 mg/dl 


 


HIV (1&2) Ab and P24 Ag, 4th


Gener 


  NEG 


 








                                                                               

                                                                 


Patient Name: BEAR JOSÉ                               Unit Number:  

[id number]                  


 








 











Dictated: 17


 


Transcribed: 17


 


PBS


 


Printed Date/Time: [~ rep prt dt]/[~ rep prt tm]








 [~ rep ct labl] - [~ rep ct ivnm]


 





   [facility]


 Radiology Department


 [place] [address] [phone]





 











Dictated: 17


 


Transcribed: 17


 


PBS


 


Printed Date/Time: [~ rep prt dt]/[~ rep prt tm]








 [~ rep ct labl] - [~ rep ct ivnm]


 








(CHEST FOR PE) ANGIO WITH





CLINICAL HISTORY: 47 years-old Female presenting with hypoxia, chest congestion.








TECHNIQUE: Multidetector CT angiography of the chest was performed after


administration of intravenous contrast. 3-D volumetric and/or maximum intensity


projection (MIP) images were subsequently reconstructed for review. IV contrast:


78 mL of Optiray 320. A dose lowering technique was used consistent with the


principles of ALARA (as low as reasonably achievable).





COMPARISON: None.





CT DOSE (mGy.cm): The estimated cumulative dose is 499.20 mGy.cm.





FINDINGS:





 topogram: Unremarkable.





Pulmonary vasculature:





The study is adequate for assessment of the pulmonary vascular tree. No filling


defect within the pulmonary arteries to suggest embolus. Main pulmonary artery


is not enlarged. No flattening of the interventricular septum. No intracardiac


intracardiac filling defect. No reflux of contrast into the hepatic veins.





Remaining chest:





On soft tissue windows, normal thyroid and thoracic inlet. Prominent mediastinal


lymph nodes, increased in size since the prior exam. An index node in the


prevascular region now measures 12 mm in short axis, previously 9 mm. Increased


prominence of precarinal and subcarinal lymph nodes as well as bilateral hilar


lymph nodes. Normal aorta. Normal heart size. No pericardial or pleural


effusion. The spleen is mildly enlarged, similar to prior exam.





On lung windows, patchy groundglass opacities have increased from prior exam,


which affects the upper lobes to the greatest degree. More solid consolidation


and centrilobular nodularity noted at the left lung base. No significant septal


thickening. No fissural nodularity. Airways patent.





On bone windows, normal osseous structures.





IMPRESSION:


1.  No evidence of pulmonary embolus.





2.  Interval increase in patchy bilateral groundglass opacities affecting the


upper lobes to the greatest degree. To be a chronic lacking in weaning process.


Given the presence of increasing mediastinal and hilar lymphadenopathy,


sarcoidosis is a diagnostic consideration. Other differential etiologies include


pneumocystis pneumonia, chronic eosinophilic pneumonia, and hypersensitivity


pneumonitis.





3.  Superimposed centrilobular nodularity and more solid consolidation at the


left lung base, which raises concern for bronchopneumonia. However, this could


also be another manifestation of the more global pulmonary disease.











Electronically signed by:  Lamin Sanchez M.D.


2017 11:26 AM





Dictated Date/Time:  2017 11:15 AM





 The status of this report is Signed.   


 Draft = Not yet reviewed or approved by Radiologist.  


 Signed = Reviewed and approved by Radiologist.


<AttendingPhy></AttendingPhy> <FamilyPhy>No Doctor, Assigned</FamilyPhy> <

PrimaryPhy>No Doctor, Assigned</PrimaryPhy> <UnitNumber>[id number]</UnitNumber> 

<VisitNumber>[id number]</VisitNumber> <PatientName>BEAR JOSÉ</

PatientName> <DateOfBirth>1970</DateOfBirth> <Location>C.EDB</Location> <

ServiceDate>17</ServiceDate> <MNE>ESINDI</MNE> <OrderingPhy>Van Pompa M.D.</OrderingPhy> <OrderingPhyMNE>f rep ord dr campos</OrderingPhyMNE> 

<DictatingPhyMNE>f rep dict dr campos</DictatingPhyMNE> <CCListMNE>f rep ct mne</

CCListMNE> <AdmittingPhyMNE>f pt admit dr campos</AdmittingPhyMNE> <AttendingPhyMNE

>f pt attend dr campos</AttendingPhyMNE>


<ConsultingPhyMNE>f pt consult dr campos</ConsultingPhyMNE> <FamilyPhyMNE>f pt fam 

dr campos</FamilyPhyMNE> <OtherPhyMNE>f pt other dr campos</OtherPhyMNE> <

PrimaryPhyMNE>f pt prim care dr campos</PrimaryPhyMNE> <ReferringPhyMNE>f pt 

referring dr campos</ReferringPhyMNE>





Assessment & Plan


47-year-old female with progressive pulmonary infiltrates and adenopathy 

associated with arthralgias, with positive blood cultures for gram-positive 

cocci.  Reviewed with microbiology lab the positive blood culture, and most 

likely this will turn out to be micrococcus and a contaminant.  However patient 

will be continued on vancomycin pending final identification.  Patient covered 

adequately for potential infectious pathogens, and await bronchoscopy and 

further serologic studies.  Will follow.  Case discussed with Pulmonary service.

## 2017-09-20 NOTE — ECHOCARDIOGRAM REPORT
*NOTICE TO RECEIVING PARTY AGENCY**  This information is strictly Confidential and 
protected under [place] law.  [place] law prohibits you from making any further 
disclosure of this information unless further disclosure is expressly permitted by the 
written consent of the person to whom it pertains or is authorized by law.  A general 
authorization for the release of medical or other information is not sufficient for this 
purpose.  Hospital accepts no responsibility if the information is made available to any 
other person, INCLUDING THE PATIENT.



Interpretation Summary

  *  Name: BEAR JOSÉ  Study Date: 2017 07:59 AM  BP: 123/75 mmHg

  *  MRN: [id number]  Patient Location: [facility]  HR: 62

  *  : 1970 (M/d/yyyy)  Gender: Female  Height: 68 in

  *  Age: 47 yrs  Ethnicity: [place]  Weight: 212 lb

  *  Ordering Physician: Laney

  *  Performed By: TERRELL Ng RCS

  *  Accession# [id number]  Account# [id number]

  *  Reason For Study: Endocarditis

  *  BSA: 2.1 m2

  *  -- Conclusions --

  *  The left ventricular wall motion is normal.

  *  The LV Ejection Fraction = 60-65%.

  *  There is mild mitral regurgitation.

  *  Compared to the prior study dated 12, mild mitral regurgitation was also 
reported at that time.

  *  There is no evidence of valvular vegetation within the scope of this imaging 
modality.

  *  If clinical suspicion for endocarditis remains high, consider transesophageal 
echocardiogram.

Procedure Details

  *  A complete two-dimensional transthoracic echocardiogram was performed (2D, M-mode, 
Doppler and color flow Doppler).

Left Ventricle

  *  The left ventricle is normal in size.

  *  There is normal left ventricular wall thickness.

  *  Left ventricular systolic function is normal.

  *  Ejection Fraction = 60-65%.

  *  The left ventricular wall motion is normal.

Right Ventricle

  *  The right ventricle is normal size.

  *  The right ventricular systolic function is normal as assessed by tricuspid annular 
plane systolic excursion (TAPSE) (normal >1.5 cm).

Atria

  *  The left atrial size is normal.

  *  Right atrial size is normal.

  *  There is no evidence of atrial septal defect, but resolution does not allow 
assessment for a patent foramen ovale.

Mitral Valve

  *  The mitral valve is normal.

  *  There is no mitral valve stenosis.

  *  There is mild mitral regurgitation.

Tricuspid Valve

  *  The tricuspid valve is normal.

  *  There is no tricuspid stenosis.

  *  Significant tricuspid regurgitation is absent.

  *  Doppler findings do not suggest pulmonary hypertension.

Aortic Valve

  *  The aortic valve is trileaflet.

  *  Aortic stenosis is absent.

  *  There is no significant aortic regurgitation.

Pulmonic Valve

  *  The pulmonary valve is not well seen, but the Doppler examination is normal without 
significant regurgitation or stenosis.

Great Vessels

  *  The aortic root and proximal ascending aorta are normal sized.

Pericardium/Pleural

  *  There is no pericardial effusion.

Great Vessels

  *  Normal inferior vena cava diameter and respiratory variation suggests normal central 
venous pressure.

  *  Normal inferior vena cava size and collapsability with sniff indicates a normal right 
atrial pressure of 3 mmHg

Left Ventricular Diastolic Function

  *  The LV diastolic function is normal.



MMode 2D Measurements and Calculations

IVSd 0.98 cm

IVSs 1.3 cm



LVIDd 4.7 cm

LVIDs 3.3 cm

LVPWd 0.94 cm

LVPWs 1.3 cm



IVS/LVPW 1.0 

FS 28.8 %

EDV(Teich) 102.3 ml

ESV(Teich) 45.6 ml

EF(Teich) 55.4 %



EDV(cubed) 103.7 ml

ESV(cubed) 37.5 ml

EF(cubed) 63.9 %

% IVS thick 33.0 %

% LVPW thick 34.6 %





LV mass(C)d 156.1 grams

LV mass(C)dI 74.5 grams/m\S\2

LV mass(C)s 142.3 grams

LV mass(C)sI 67.9 grams/m\S\2



SV(Teich) 56.6 ml

SI(Teich) 27.0 ml/m\S\2

SV(cubed) 66.2 ml

SI(cubed) 31.6 ml/m\S\2



Ao root diam 3.3 cm

Ao root area 8.8 cm\S\2

ACS 1.1 cm

LA dimension 3.2 cm



asc Aorta Diam 2.0 cm





LA/Ao 0.97 













Doppler Measurements and Calculations

MV E max ana paula 112.1 cm/sec

MV A max ana paula 79.4 cm/sec



MV E/A 1.4 



MV P1/2t max ana paula 131.6 cm/sec

MV P1/2t 84.4 msec

MVA(P1/2t) 2.6 cm\S\2

MV dec slope 456.4 cm/sec\S\2

MV dec time 0.20 sec



Ao V2 max 120.0 cm/sec

Ao max PG 5.8 mmHg

Ao max PG (full) 1.7 mmHg





LV V1 max PG 4.0 mmHg



LV V1 max 100.5 cm/sec



PA V2 max 85.8 cm/sec

PA max PG 2.9 mmHg



TR max ana paula 210.1 cm/sec

## 2017-09-20 NOTE — BRONCHOSCOPY PROCEDURE NOTE
Bronchoscopy Procedure Note


Procedure: Flexible-Bronchoscopy, EBUS, Tbbx, GETA, bronchial lavage left upper 

lobe 


Consent: Obtained through the patient placed into the chart


Pre-Procedural Dx: Mediastinal adenopathy with abnormal CT


Post-Procedural Dx: Mediastinal lymphadenopathy with notable granulomas


Analgesia:


   GETA


Sedation:


   GETA


Procedure:


The Olympus video bronchoscope and EBUS scope were used for this procedure 


Initially the flexible bronchoscope was used for evaluation of the airways.


The LMA was properly positioned at the level of the glottis.


Vocal cords: Anatomically within normal limits


Trachea: Diffuse mucous secretions along with 90% EDAC


[place]: Anatomically within normal limits


Right bronchial tree:


   Right mainstem bronchus: Anatomically within normal limits


   Right upper lobe: Anatomically within normal limits


   Bronchus intermedius: Anatomically within normal limits


   Right middle lobe: Anatomically within normal limits


   Right lower lobe: Anatomically within normal limits


   Findings: Diffuse mucous secretions


Left bronchial tree:


   Left mainstem bronchus: 90% EDAC


   Left upper lobe: Anatomically within normal limits


   Lingula: Anatomically within normal limits


   Left lower lobe: Anatomically within normal limits


   Findings: Diffuse mucous secretions


EBUS/PETER:


   Tbbx Michelle Stations:


   7:  # of passes   3 


   4R: # of passes  passes 3 


   4L: # of passes  3   





EBL: 


Complications:


   None


Follow-up: Return to her inpatient room

## 2017-09-20 NOTE — ANESTHESIOLOGY PROGRESS NOTE
Anesthesia Post Op Note


Date & Time


Sep 20, 2017 at 15:10





Vital Signs


Pain Intensity:  4





Vital Signs Past 12 Hours








  Date Time  Temp Pulse Resp B/P (MAP) Pulse Ox O2 Delivery O2 Flow Rate FiO2


 


9/20/17 15:00 36.5 63 20 134/80 92 Room Air  


 


9/20/17 14:50  67 20 129/81 97 Oxymask 10 


 


9/20/17 14:40  70 20 104/71 97 Oxymask 10 


 


9/20/17 14:30 36.4 92 20 120/87 98 Oxymask 10 


 


9/20/17 08:30      Room Air  


 


9/20/17 07:33 36.4 69 18 122/74 (90) 95   











Notes


Mental Status:  alert / awake / arousable, participated in evaluation


Pt Amnestic to Procedure:  Yes


Nausea / Vomiting:  adequately controlled


Pain:  adequately controlled


Airway Patency, RR, SpO2:  stable & adequate


BP & HR:  stable & adequate


Hydration State:  stable & adequate


Anesthetic Complications:  no major complications apparent

## 2017-09-20 NOTE — PHARMACY PROGRESS NOTE
Pharmacy Antibiotic Consult


Date of Service:


Sep 20, 2017.


Pharmacy Dosing Scope


* Pharmacy is consulted to initiate Vancomycin IV dosing therapy, order 

appropriate labs and adjust drug dose/frequency.





Subjective


* The patient is a 47 year old female admitted on Sep 17, 2017 at 12:12.





Objective


Height (Feet):  5


Height (Inches):  8.00


Weight (Kilograms):  96.400


Lab Results (24hrs):











Test


  9/19/17


05:46 9/19/17


14:29


 


White Blood Count


  8.56 K/uL


(4.8-10.8) 


 


 


Red Blood Count


  3.79 M/uL


(4.2-5.4) 


 


 


Hemoglobin


  10.9 g/dL


(12.0-16.0) 


 


 


Hematocrit 35.1 % (37-47)  


 


Mean Corpuscular Volume


  92.6 fL


() 


 


 


Mean Corpuscular Hemoglobin


  28.8 pg


(25-34) 


 


 


Mean Corpuscular Hemoglobin


Concent 31.1 g/dl


(32-36) 


 


 


RDW Standard Deviation


  54.0 fL


(36.4-46.3) 


 


 


RDW Coefficient of Variation


  16.1 %


(11.5-14.5) 


 


 


Platelet Count


  248 K/uL


(130-400) 


 


 


Mean Platelet Volume


  10.0 fL


(7.4-10.4) 


 


 


Sodium Level


  141 mmol/L


(136-145) 


 


 


Potassium Level


  3.5 mmol/L


(3.5-5.1) 


 


 


Chloride Level


  107 mmol/L


() 


 


 


Carbon Dioxide Level


  26 mmol/L


(21-32) 


 


 


Anion Gap


  8.0 mmol/L


(3-11) 


 


 


Blood Urea Nitrogen


  15 mg/dl


(7-18) 


 


 


Creatinine


  0.75 mg/dl


(0.60-1.20) 


 


 


Est Creatinine Clear Calc


Drug Dose 113.8 ml/min 


  


 


 


Estimated GFR (


American) 110.8 


  


 


 


Estimated GFR (Non-


American 95.6 


  


 


 


BUN/Creatinine Ratio 19.3 (10-20)  


 


Random Glucose


  127 mg/dl


(70-99) 


 


 


Estimated Average Glucose 123 mg/dl  


 


Hemoglobin A1c


  5.9 %


(4.5-5.6) 


 


 


Calcium Level


  8.9 mg/dl


(8.5-10.1) 


 








Micro Results:











Item Value  Date Time


 


Blood Culture - Preliminary Resulted 9/17/17 0848





Blood Gram Positive Cocci 











Assessment & Plan





* Loading dose:   2250mg IV (~ 23.3mg/kg) X 1 dose then 1500mg IV (~25.5mg/kg) 

every 10 hours.





* Goal peak level estimate:  between 30-40 mcg/mL.





* Goal trough level estimate:  between 16-20 mcg/mL.





* Trough level is ordered for 0130 on 09/21/17.














Pharmacy will continue to follow and will adjust dose/frequency as necessary.  

Thank you

## 2017-09-21 VITALS
DIASTOLIC BLOOD PRESSURE: 80 MMHG | HEART RATE: 67 BPM | TEMPERATURE: 98.24 F | SYSTOLIC BLOOD PRESSURE: 128 MMHG | OXYGEN SATURATION: 96 %

## 2017-09-21 VITALS
DIASTOLIC BLOOD PRESSURE: 80 MMHG | OXYGEN SATURATION: 96 % | HEART RATE: 67 BPM | TEMPERATURE: 98.24 F | SYSTOLIC BLOOD PRESSURE: 128 MMHG

## 2017-09-21 LAB
ANION GAP SERPL CALC-SCNC: 7 MMOL/L (ref 3–11)
BUN SERPL-MCNC: 14 MG/DL (ref 7–18)
BUN/CREAT SERPL: 18.9 (ref 10–20)
CALCIUM SERPL-MCNC: 9 MG/DL (ref 8.5–10.1)
CHLORIDE SERPL-SCNC: 103 MMOL/L (ref 98–107)
CO2 SERPL-SCNC: 27 MMOL/L (ref 21–32)
CREAT CL PREDICTED SERPL C-G-VRATE: 115.3 ML/MIN
CREAT SERPL-MCNC: 0.74 MG/DL (ref 0.6–1.2)
GLUCOSE SERPL-MCNC: 103 MG/DL (ref 70–99)
LEGIONELLA ANTIGEN: NOT DETECTED
LEGIONELLA PNEUMOPHILA 7+8+9+10+11+12+13+14 AB [TITER] IN SERUM: (no result) TITER
POTASSIUM SERPL-SCNC: 3.7 MMOL/L (ref 3.5–5.1)
SODIUM SERPL-SCNC: 137 MMOL/L (ref 136–145)

## 2017-09-21 RX ADMIN — VENLAFAXINE HYDROCHLORIDE SCH MG: 150 CAPSULE, EXTENDED RELEASE ORAL at 08:23

## 2017-09-21 RX ADMIN — LANSOPRAZOLE SCH MG: 15 TABLET, ORALLY DISINTEGRATING, DELAYED RELEASE ORAL at 08:23

## 2017-09-21 RX ADMIN — AZITHROMYCIN SCH MG: 250 TABLET, FILM COATED ORAL at 08:25

## 2017-09-21 RX ADMIN — GABAPENTIN SCH MG: 300 CAPSULE ORAL at 08:23

## 2017-09-21 RX ADMIN — IPRATROPIUM BROMIDE AND ALBUTEROL SCH PUFFS: 20; 100 SPRAY, METERED RESPIRATORY (INHALATION) at 12:57

## 2017-09-21 RX ADMIN — VENLAFAXINE HYDROCHLORIDE SCH MG: 75 CAPSULE, EXTENDED RELEASE ORAL at 08:24

## 2017-09-21 RX ADMIN — IPRATROPIUM BROMIDE AND ALBUTEROL SCH PUFFS: 20; 100 SPRAY, METERED RESPIRATORY (INHALATION) at 08:22

## 2017-09-21 RX ADMIN — CLONAZEPAM PRN MG: 1 TABLET ORAL at 08:41

## 2017-09-21 RX ADMIN — INSULIN ASPART SCH UNITS: 100 INJECTION, SOLUTION INTRAVENOUS; SUBCUTANEOUS at 08:31

## 2017-09-21 RX ADMIN — CEFTRIAXONE SODIUM SCH MLS/HR: 1 INJECTION, POWDER, FOR SOLUTION INTRAVENOUS at 10:22

## 2017-09-21 RX ADMIN — INSULIN ASPART SCH UNITS: 100 INJECTION, SOLUTION INTRAVENOUS; SUBCUTANEOUS at 12:49

## 2017-09-21 RX ADMIN — GABAPENTIN SCH MG: 300 CAPSULE ORAL at 14:07

## 2017-09-21 RX ADMIN — METHADONE HYDROCHLORIDE SCH MG: 10 SOLUTION ORAL at 08:41

## 2017-09-21 NOTE — DISCHARGE INSTRUCTIONS
Discharge Instructions


Date of Service


Sep 21, 2017.





Admission


Reason for Admission:  Fever,Sob





Discharge


Discharge Diagnosis / Problem:  Fever, Dyspnea/ Hypoxia, Mediastinal and Hilar 

lymphadenopathy





Discharge Goals


Goal(s):  Decrease discomfort, Improve function, Improve disease control





Activity Recommendations


Activity Limitations:  resume your previous activity (as tolerated)





.





Instructions / Follow-Up


Instructions / Follow-Up


Follow up with Dr. Vera on 9/27 @ 4:45 pm


Follow up with pulmonology with Dr. Caceres or Nancy WHITMORE in about 2 weeks 

(call to schedule appointment)


Address: [address] [place] [address]. 


Phone : [phone]





Continue prednisone taper


Counseling about smoking cessation


Continue follow up with the methadone clinic


Follow a health diet and exercise





Current Hospital Diet


Patient's current hospital diet: Regular Diet, Diabetes Type 2 Diet





Discharge Diet


Recommended Diet:  Regular Diet





Procedures


Procedures Performed:  


Endobronchial Ultrasound Guided Bronchoscopy, Flexible Bronchoscopy, 


Trans-Tracheal and Bronchial Needle Aspiration, Bronchial Lavage





Pending Studies


Studies pending at discharge:  yes


List of pending studies:  


Bronchoscopy biopsy pending





Laboratory Results





Hemoglobin A1c








Test


  9/19/17


05:46 Range/Units


 


 


Estimated Average Glucose 123   mg/dl


 


Hemoglobin A1c 5.9 H 4.5-5.6  %











Medical Emergencies








.


Who to Call and When:





Medical Emergencies:  If at any time you feel your situation is an emergency, 

please call 911 immediately.





.





Non-Emergent Contact


Non-Emergency issues call your:  Primary Care Provider


Call Non-Emergent contact if:  you have any medication questions (due to hx of 

Peptic ulcer bleeding)





.


.








"Provider Documentation" section prepared by Marce Davison.








.





VTE Core Measure


Inpt VTE Proph given/why not?:  SCD's





[place] Drug Monitoring Program


Search Results:  patient reviewed within database

## 2017-09-21 NOTE — ANESTHESIOLOGY PROGRESS NOTE
Anesthesia Post Op Note


Date & Time


Sep 21, 2017 at 10:01





Vital Signs


Pain Intensity:  7.0





Vital Signs Past 12 Hours








  Date Time  Temp Pulse Resp B/P (MAP) Pulse Ox O2 Delivery O2 Flow Rate FiO2


 


9/21/17 07:18 36.8 67 18 128/80 (96) 96 Room Air  


 


9/21/17 00:00      Room Air  


 


9/20/17 23:05 36.7 56 18 120/70 (87) 96 Nasal Cannula 2.0 











Notes


Mental Status:  alert / awake / arousable, participated in evaluation


Pt Amnestic to Procedure:  Yes


Nausea / Vomiting:  adequately controlled


Pain:  adequately controlled


Airway Patency, RR, SpO2:  stable & adequate


BP & HR:  stable & adequate


Hydration State:  stable & adequate


Anesthetic Complications:  no major complications apparent

## 2017-09-21 NOTE — PROGRESS NOTE
Medicine Progress Note


Date & Time of Visit:


Sep 21, 2017 at 12:45.


Subjective


Pt was seen and examined


Lying in bed comfortable with no distress


Pt said that she feels much better


she said that her cough improved significantly


Denies any chest pain, palpitation, dizziness and sob





Objective





Last 8 Hrs








  Date Time  Temp Pulse Resp B/P (MAP) Pulse Ox O2 Delivery O2 Flow Rate FiO2


 


9/21/17 11:07      Room Air  


 


9/21/17 07:18 36.8 67 18 128/80 (96) 96 Room Air  








Physical Exam:


General- No acute distress


Head-  atraumatic


Eyes- PERRL, EOMI


ENT- oropharynx clear


Neck- supple, no JVD


Lungs- No wheezing


Heart- regular rhythm


Abdomen- normal bowel sounds


Extremities- no calf tenderness


Neuro- alert, oriented x 3; PERRL, EOMI


Skin- warm & dry


Laboratory Results:





Last 24 Hours








Test


  9/20/17


14:52 9/20/17


19:40 9/21/17


06:50 9/21/17


07:29


 


Bedside Glucose 162 mg/dl    121 mg/dl 


 


Urine Collection Time  24 HOURS   


 


Urine Total Volume  2800 mL   


 


Urine Calcium mg%  < 5.0 mg/dl   


 


Urine Calcium 24 Hour


  


  < 140.0 mg/24


HR 


  


 


 


Sodium Level   137 mmol/L  


 


Potassium Level   3.7 mmol/L  


 


Chloride Level   103 mmol/L  


 


Carbon Dioxide Level   27 mmol/L  


 


Anion Gap   7.0 mmol/L  


 


Blood Urea Nitrogen   14 mg/dl  


 


Creatinine   0.74 mg/dl  


 


Est Creatinine Clear Calc


Drug Dose 


  


  115.3 ml/min 


  


 


 


Estimated GFR (


American) 


  


  112.6 


  


 


 


Estimated GFR (Non-


American 


  


  97.2 


  


 


 


BUN/Creatinine Ratio   18.9  


 


Random Glucose   103 mg/dl  


 


Calcium Level   9.0 mg/dl  


 


Test


  9/21/17


11:07 


  


  


 


 


Bedside Glucose 137 mg/dl    











Assessment & Plan


Dyspnea /Hypoxia


Secondary to Pneumonia


Has been having recurrent pneumonia


CXR showed vague patchy opacity in the right mid and lung base. 


CTA chest showed no evidence of PE. Interval increase in patchy bilateral 

groundglass opacities affecting the upper lobes to the greatest degree


Received Rocephin and zithromax, and solumedrol  in the ER


Continue rocephin and Zithromax


Continue prednisone 40mg


Duoneb treatment, will add guaifenesin for the cough 


blood cx and urine legionella, serum mycoplasma pending


Continue oxygen supplement


Pulmonary on board


If symptoms worsening, will consider a bronch as per pulmonary team


9/21


received 5 days of Zithromax and Rocephin


S/p bronch yesterday


Lung biopsy pending


Case discussed with Pulmonology  Dr. Caceres


Need to follow with pulmonology in about 1 to 2 weeks


2 step exercise done and pt does not qualify for oxygen supplement


Continue prednisone taper


Urine for legionella negative


HIV ab negative


HIV RNA pending








Mediastinal and Hilar lymphadenopathy


will need to r/o sarcoidosis


Pulmonary consulted


angiotensin converting enzymes pending


Bronch done on 9/20, biopsy pending


will need more outpatient work up as per pulmonology


Follow up with pulmonology in about 2 weeks








Bacteremia


Blood cx positive for gram positive cocci


will start on IV vanco 


Will consult ID


repeat blood cx tomorrow


consider echo


monitor CBC


9/21


Lab called to inform the positive blood cx was due to contamination


D/C vanco


ID on board





Hx of drug abuse


Follow with the methadone clinic


PDMP reviewed showed last refill for Adderall was in March 


Also klonopin was refilled on 8/17 for 15 tabs daily


Pt said that she was prescribed it q6h and she was taking the Adderall BID





Fibromyalgia


On gabapentin


On Ibuprofen at home, will consider Tylenol due to hx of peptic ulcer 





Elevated BP


Possible situational


Will monitor BP


BP control





Elevated blood glucose


Hba1c 5.9


D/C D5 water


will start on a sliding scale because of the elevated BS from the steroid


advised pt about Diet and losing weight








Tobacco abuse 


Counseling on smoking cessation


Nicotine patch





Anxiety


On klonopin and effexor


Pt was taking klonopin q6hr prn


Informed pt that her last script for Klonopin was ordered to take daily





Hx Peptic ulcer with hemorrhage


Stable





DVT px on SCDs due to prior history of peptic ulcer bleeding about 1 yr ago





CODE STATUS Full code





Disposition


Will discharge today after 2 steps


Follow up with Dr. Vera on 9/27 @ 4:45 pm


Follow up with pulmonology with Dr. Caceres or Nancy WHITMORE in about 2 weeks 

(call to schedule appointment)


Address: [address] [place] [address]. 


Phone : [phone]


Consultants:


Pulmonary


ID


Procedures:


Bronchoscopy with biopsy


Current Inpatient Medications:





Current Inpatient Medications








 Medications


  (Trade)  Dose


 Ordered  Sig/Anibal


 Route  Start Time


 Stop Time Status Last Admin


Dose Admin


 


 Ceftriaxone


 Sodium 1 gm/


 Dextrose  50 ml @ 


 100 mls/hr  Q24H


 IV  9/18/17 10:00


 9/24/17 09:59  9/21/17 10:22


100 MLS/HR


 


 Docusate Sodium


  (coLACE CAP)  100 mg  DAILY  PRN


 PO  9/17/17 12:45


 10/17/17 12:44   


 


 


 Gabapentin


  (Neurontin Cap)  300 mg  DAILY@1400


 PO  9/18/17 14:00


 10/18/17 13:59  9/19/17 12:39


300 MG


 


 Gabapentin


  (Neurontin Cap)  300 mg  QAM


 PO  9/18/17 09:00


 10/18/17 08:59  9/21/17 08:23


300 MG


 


 Gabapentin


  (Neurontin Cap)  600 mg  HS


 PO  9/17/17 21:00


 10/17/17 20:59  9/20/17 20:45


600 MG


 


 Venlafaxine HCl


  (effeXOR


 EXTENDED REL CAP)  75 mg  DAILY


 PO  9/18/17 09:00


 10/18/17 08:59  9/21/17 08:24


75 MG


 


 Venlafaxine HCl


  (effeXOR


 EXTENDED REL CAP)  150 mg  DAILY


 PO  9/18/17 09:00


 10/18/17 08:59  9/21/17 08:23


150 MG


 


 Methadone HCl


  (Methadone HCl)  149 mg  QAM


 PO  9/18/17 09:00


 10/2/17 08:59 Future hold 9/21/17 08:41


149 MG


 


 Clonazepam


  (Klonopin Tab)  1 mg  DAILY  PRN


 PO  9/17/17 12:45


 10/17/17 12:44  9/21/17 08:41


1 MG


 


 Ondansetron HCl


  (Zofran Inj)  4 mg  Q6H  PRN


 IV  9/17/17 12:45


 10/17/17 12:44   


 


 


 Non-Formulary


 Medication


  (Patient'S Own


 Controlled Med)  1 ea  QAM  PRN


 N/A  9/18/17 09:00


 10/2/17 08:59 Future hold 9/18/17 08:17


1 EA


 


 Lansoprazole


  (Prevacid


 Solutab)  15 mg  BID


 PO  9/17/17 21:00


 10/17/17 20:59  9/21/17 08:23


15 MG


 


 Ibuprofen


  (Motrin Tab)  600 mg  TID  PRN


 PO  9/17/17 18:45


 10/17/17 18:44  9/19/17 19:43


600 MG


 


 Codeine Phosphate/


 Guaifenesin


  (Robitussin-AC


 Sugar Free Syrup)  10 ml  Q6H  PRN


 PO  9/18/17 19:45


 10/18/17 19:44  9/20/17 18:53


10 ML


 


 Ketorolac


 Tromethamine


  (Toradol Inj)  30 mg  Q6H  PRN


 IV  9/18/17 20:45


 9/23/17 20:44  9/20/17 18:54


30 MG


 


 Albuterol/


 Ipratropium


  (Combivent


 Respimat Inh)  1 puffs  QID


 INH  9/19/17 13:00


 10/19/17 12:59  9/21/17 08:22


1 PUFFS


 


 Azithromycin


  (Zithromax Tab)  500 mg  DAILY


 PO  9/20/17 09:00


 9/24/17 08:59  9/21/17 08:25


500 MG


 


 Prednisone


  (PredniSONE TAB)  20 mg  DAILY


 PO  9/21/17 09:00


 10/18/17 08:59  9/21/17 08:22


20 MG


 


 Insulin Aspart


  (novoLOG ASPART)  **SLIDING


 SCALE**


 **If C...  ACHS


 SC  9/20/17 21:00


 10/20/17 20:59  9/21/17 08:31


1 UNITS


 


 Glucose


  (Glucose 40% Gel)  15-30


 GRAMS 15


 GRAMS...  UD  PRN


 PO  9/20/17 18:00


 10/20/17 17:59   


 


 


 Glucose


  (Glucose Chew


 Tab)  4-8


 Tablets 4


 Tabl...  UD  PRN


 PO  9/20/17 18:00


 10/20/17 17:59   


 


 


 Dextrose


  (Dextrose 50%


 50ML Syringe)  25-50ML OF


 50% DW IV


 FOR...  UD  PRN


 IV  9/20/17 18:00


 10/20/17 17:59   


 


 


 Glucagon


  (Glucagon Inj)  1 mg  UD  PRN


 SQ  9/20/17 18:00


 10/20/17 17:59

## 2017-09-25 NOTE — DISCHARGE SUMMARY
Discharge Summary


Date of Service


Sep 25, 2017.





Discharge Summary


Admission Date:


Sep 17, 2017 at 12:12


Discharge Date:  Sep 21, 2017


Discharge Disposition:  Home


Principal Diagnosis:


 Fever/Dyspnea


Secondary Diagnoses/Problems:


Anxiety


Mediastinal and Hilar lymphadenopathy


Tobacca Abuse


Elevated BP


Hx of drug abuse


Fibromyalgia


Hx Peptic ulcer with hemorrhage
































Procedures:


Bronchoscopy with biopsy


Consultations:


Pulmonary


ID





Medication Reconciliation


New Medications:  


Prednisone (Prednisone) 5 Mg Tab


5 MG PO UD for 15 Days, TAB


take 20 mg for 3 days, then 15 mg for 3 days, then 10 mg for 3 days,


 then continue 5 mg daily


 


Continued Medications:  


Clonazepam (Klonopin) 1 Mg Tab


1 MG PO DAILY PRN for Anxiety, TAB





Docusate Sodium (Docusate Sodium) 100 Mg Cap


100 MG PO DAILY PRN for Constipation





Gabapentin (Gabapentin) 300 Mg Cap


300 MG PO AFTERNOON





Gabapentin (Gabapentin) 300 Mg Cap


300 MG PO QAM





Gabapentin (Neurontin) 300 Mg Cap


600 MG PO HS, CAP





Hydroxyzine HCl (Hydroxyzine HCl) 25 Mg Tab


1 CAP DAILY PRN for Anxiety





Ibuprofen Tab (Advil) 200 Mg Tab


600 MG PO Q6 PRN for Pain, TAB





Methadone Hcl (Methadone Hcl) 10 Mg/5 Ml Sol


149 MG PO QAM





Omeprazole (Prilosec) 20 Mg Cap


20 MG PO BID





Ondasetron Odt (Zofran Odt) 4 Mg Tab


4 MG SL Q12 PRN for Nausea, #6 TAB





Venlafaxine Hcl (Effexor Extended Rel) 75 Mg Capcr


75 MG PO DAILY


TAKE ONE 75 MG CAPSULE ALONG WITH  MG CAPSULE TO EQUAL 225 MG


 DAILY DOSE


Venlafaxine Hcl (Effexor Extended Rel) 150 Mg Capcr


150 MG PO DAILY


TAKE  MG CAPSULE ALONG WITH ONE 75 MG CAPSULE TO EQUAL 225 MG


 DAILY DOSE








Admission Information


HPI (per Admitting provider):


47 year old female with PMH of anemia, peptic ulcer with hemorrhage, 

fibromyalgia, Drug addiction on methadone, Depression presents to the Emergency 

Room with complaints of worsening dyspnea. Pt said that she has been having 

worsening SOB for  the past week that is worst on exertion. she saw her pcp 1 

week ago and was giving Augmentin with no help. she said that she has been 

using her albuterol inhaler with no help. She said that she checked her oxygen 

saturation and it was btw 84 to 88%. She also has been having fever and chills. 

she checked her temp at room and it was in the 102. Pt said that she has been 

treated for PNA 4 times since January. She states that she is a smoker and has 

been smoking less about 1/2 pack a day since developing dyspnea. She said that 

she has been having a dry coughing. She also complaints of chest tenderness 

that is associated with the cough. She also has been having nausea, abdominal 

discomfort associated with bloating. Denies any sick contact or recent travel. 

Denies any palpitation, dizziness, diarrhea.


Physical Exam (per Admitting):  


   General Appearance:  WD/WN, no apparent distress


   Head:  normocephalic, atraumatic


   Eyes:  PERRL, EOMI


   ENT:  hearing grossly normal


   Neck:  supple, no JVD


   Respiratory/Chest:  no respiratory distress, no accessory muscle use


   Cardiovascular:  no JVD, + tachycardia


   Abdomen/GI:  normal bowel sounds


   Back:  no CVA tenderness


   Extremities/Musculoskelatal:  no calf tenderness


   Neurologic/Psych:  alert, oriented x 3


   Skin:  warm/dry, no rash





Hospital Course


Dyspnea /Hypoxia


Secondary to Pneumonia


Has been having recurrent pneumonia


CXR showed vague patchy opacity in the right mid and lung base. 


CTA chest showed no evidence of PE. Interval increase in patchy bilateral 

groundglass opacities affecting the upper lobes to the greatest degree


Received Rocephin and zithromax, and solumedrol  in the ER


Continue rocephin and Zithromax


Continue prednisone 40mg


Duoneb treatment, will add guaifenesin for the cough 


blood cx and urine legionella, serum mycoplasma pending


Continue oxygen supplement


Pulmonary on board


If symptoms worsening, will consider a bronch as per pulmonary team


9/21


received 5 days of Zithromax and Rocephin


S/p bronch yesterday


Lung biopsy pending


Case discussed with Pulmonology  Dr. Caceres


Need to follow with pulmonology in about 1 to 2 weeks


2 step exercise done and pt does not qualify for oxygen supplement


Continue prednisone taper


Urine for legionella negative


HIV ab negative


HIV RNA pending








Mediastinal and Hilar lymphadenopathy


will need to r/o sarcoidosis


Pulmonary consulted


angiotensin converting enzymes pending


Bronch done on 9/20, biopsy pending


will need more outpatient work up as per pulmonology


Follow up with pulmonology in about 2 weeks








Bacteremia


Blood cx positive for gram positive cocci


will start on IV vanco 


Will consult ID


repeat blood cx tomorrow


consider echo


monitor CBC


9/21


Lab called to inform the positive blood cx was due to contamination


D/C vanco


ID on board





Hx of drug abuse


Follow with the methadone clinic


PDMP reviewed showed last refill for Adderall was in March 


Also klonopin was refilled on 8/17 for 15 tabs daily


Pt said that she was prescribed it q6h and she was taking the Adderall BID





Fibromyalgia


On gabapentin


On Ibuprofen at home, will consider Tylenol due to hx of peptic ulcer 





Elevated BP


Possible situational


Will monitor BP


BP control





Elevated blood glucose


Hba1c 5.9


D/C D5 water


will start on a sliding scale because of the elevated BS from the steroid


advised pt about Diet and losing weight








Tobacco abuse 


Counseling on smoking cessation


Nicotine patch





Anxiety


On klonopin and effexor


Pt was taking klonopin q6hr prn


Informed pt that her last script for Klonopin was ordered to take daily





Hx Peptic ulcer with hemorrhage


Stable





DVT px on SCDs due to prior history of peptic ulcer bleeding about 1 yr ago





CODE STATUS Full code





Disposition


Will discharge today after 2 steps


Follow up with Dr. Vera on 9/27 @ 4:45 pm


Follow up with pulmonology with Dr. Caceres or Nancy WHITMORE in about 2 weeks 

(call to schedule appointment)


Address: [address] [place] [address]. 


Phone : [phone]


Total time spent on discharge = 35 minutes


This includes examination of the patient, discharge planning, medication 

reconciliation, and communication with other providers.





Discharge Instructions


Discharge Instructions


Date of Service


Sep 21, 2017.





Admission


Reason for Admission:  Fever,Sob





Discharge


Discharge Diagnosis / Problem:  Fever, Dyspnea/ Hypoxia, Mediastinal and Hilar 

lymphadenopathy





Discharge Goals


Goal(s):  Decrease discomfort, Improve function, Improve disease control





Activity Recommendations


Activity Limitations:  resume your previous activity (as tolerated)





.





Instructions / Follow-Up


Instructions / Follow-Up


Follow up with Dr. Vera on 9/27 @ 4:45 pm


Follow up with pulmonology with Dr. Caceres or Nancy WHITMORE in about 2 weeks 

(call to schedule appointment)


Address: [address] [facility]. 


Phone : [phone]





Continue prednisone taper


Counseling about smoking cessation


Continue follow up with the methadone clinic


Follow a health diet and exercise





Current Hospital Diet


Patient's current hospital diet: Regular Diet, Diabetes Type 2 Diet





Discharge Diet


Recommended Diet:  Regular Diet





Procedures


Procedures Performed:  


Endobronchial Ultrasound Guided Bronchoscopy, Flexible Bronchoscopy, 


Trans-Tracheal and Bronchial Needle Aspiration, Bronchial Lavage





Laboratory Results





Hemoglobin A1c








Test


  9/19/17


05:46 Range/Units


 


 


Estimated Average Glucose 123   mg/dl


 


Hemoglobin A1c 5.9 H 4.5-5.6  %











Medical Emergencies








.


Who to Call and When:





Medical Emergencies:  If at any time you feel your situation is an emergency, 

please call 911 immediately.





.





Non-Emergent Contact


Non-Emergency issues call your:  Primary Care Provider


Call Non-Emergent contact if:  you have any medication questions (due to hx of 

Peptic ulcer bleeding)





.


.








"Provider Documentation" section prepared by Marce Davison.








.





VTE Core Measure


Inpt VTE Proph given/why not?:  SCD's





[place] Drug Monitoring Program


Search Results:  patient reviewed within database





Additional Copies To


Tamara VERA M.D.

## 2017-10-19 ENCOUNTER — HOSPITAL ENCOUNTER (OUTPATIENT)
Dept: HOSPITAL 45 - C.RDSM | Age: 47
Discharge: HOME | End: 2017-10-19
Attending: PHYSICIAN ASSISTANT
Payer: COMMERCIAL

## 2017-10-19 DIAGNOSIS — R07.81: Primary | ICD-10-CM

## 2017-10-19 DIAGNOSIS — Z87.81: ICD-10-CM

## 2017-10-19 DIAGNOSIS — M79.671: ICD-10-CM

## 2017-10-19 NOTE — DIAGNOSTIC IMAGING REPORT
RIGHT FOOT 3 VIEWS



CLINICAL HISTORY: Right foot pain. Fall.



FINDINGS: 3 views of the right foot are compared to study dated 9/4/2016. The

skeletal structures are well mineralized. No acute fracture is seen. There is

cortical thickening in the second and third metatarsal shaft, likely

representing healed fractures. The joint spaces of the foot are well-maintained.

The overlying soft tissues are within normal limits.



IMPRESSION:



1. No acute fracture is seen.



2. Cortical thickening in the second and third metatarsal shafts likely

represents healed stress fractures.







Electronically signed by:  Darshan Montgomery M.D.

10/19/2017 4:12 PM



Dictated Date/Time:  10/19/2017 4:04 PM

## 2017-10-19 NOTE — DIAGNOSTIC IMAGING REPORT
L RIBS UNILATERAL W/CHEST



CLINICAL HISTORY: Left-sided rib pain.    



COMPARISON STUDY:  Chest radiograph and chest CT September 17, 2017.



FINDINGS: Bilateral airspace opacities shown on exam of September 7 22,017 have

resolved. There is no pneumothorax or pleural effusion. Pulmonary vascularity is

normal. Cardiomediastinal silhouette is normal. There are old, healed fractures

of the left fifth and sixth ribs. No acute left rib fractures are identified.



IMPRESSION:  



1. No pneumothorax. No acute left-sided rib fractures. Old left fifth and sixth

rib fractures.



2. Interval resolution of airspace opacities within the lungs which were shown

on chest CT of September 17, 2017. 









Electronically signed by:  Galileo Murphy M.D.

10/19/2017 4:08 PM



Dictated Date/Time:  10/19/2017 4:05 PM

## 2017-12-01 ENCOUNTER — HOSPITAL ENCOUNTER (OUTPATIENT)
Dept: HOSPITAL 45 - C.CPL | Age: 47
Discharge: HOME | End: 2017-12-01
Payer: COMMERCIAL

## 2017-12-01 DIAGNOSIS — F11.20: Primary | ICD-10-CM

## 2018-03-09 ENCOUNTER — HOSPITAL ENCOUNTER (OUTPATIENT)
Dept: HOSPITAL 45 - C.LAB1850 | Age: 48
Discharge: HOME | End: 2018-03-09
Attending: PHYSICIAN ASSISTANT
Payer: COMMERCIAL

## 2018-03-09 DIAGNOSIS — R50.9: Primary | ICD-10-CM

## 2018-03-09 DIAGNOSIS — D86.9: ICD-10-CM

## 2018-03-09 DIAGNOSIS — R06.02: ICD-10-CM

## 2018-03-09 LAB
BASOPHILS # BLD: 0.03 K/UL (ref 0–0.2)
BASOPHILS NFR BLD: 0.5 %
BUN SERPL-MCNC: 13 MG/DL (ref 7–18)
CALCIUM SERPL-MCNC: 9.3 MG/DL (ref 8.5–10.1)
CO2 SERPL-SCNC: 23 MMOL/L (ref 21–32)
CREAT SERPL-MCNC: 0.95 MG/DL (ref 0.6–1.2)
EOS ABS #: 0.14 K/UL (ref 0–0.5)
EOSINOPHIL NFR BLD AUTO: 250 K/UL (ref 130–400)
FLUAV RNA SPEC QL NAA+PROBE: (no result)
FLUBV RNA SPEC QL NAA+PROBE: (no result)
GLUCOSE SERPL-MCNC: 122 MG/DL (ref 70–99)
HCT VFR BLD CALC: 43.3 % (ref 37–47)
HGB BLD-MCNC: 14.8 G/DL (ref 12–16)
IG#: 0.06 K/UL (ref 0–0.02)
IMM GRANULOCYTES NFR BLD AUTO: 25.3 %
LYMPHOCYTES # BLD: 1.61 K/UL (ref 1.2–3.4)
MCH RBC QN AUTO: 30.2 PG (ref 25–34)
MCHC RBC AUTO-ENTMCNC: 34.2 G/DL (ref 32–36)
MCV RBC AUTO: 88.4 FL (ref 80–100)
MONO ABS #: 0.33 K/UL (ref 0.11–0.59)
MONOCYTES NFR BLD: 5.2 %
NEUT ABS #: 4.19 K/UL (ref 1.4–6.5)
NEUTROPHILS # BLD AUTO: 2.2 %
NEUTROPHILS NFR BLD AUTO: 65.9 %
PMV BLD AUTO: 9.4 FL (ref 7.4–10.4)
POTASSIUM SERPL-SCNC: 3.6 MMOL/L (ref 3.5–5.1)
RED CELL DISTRIBUTION WIDTH CV: 14.2 % (ref 11.5–14.5)
RED CELL DISTRIBUTION WIDTH SD: 46 FL (ref 36.4–46.3)
SODIUM SERPL-SCNC: 135 MMOL/L (ref 136–145)
WBC # BLD AUTO: 6.36 K/UL (ref 4.8–10.8)

## 2018-03-09 NOTE — DIAGNOSTIC IMAGING REPORT
TWO VIEW CHEST



CLINICAL HISTORY: Sarcoidosis. Dyspnea.



FINDINGS: PA and lateral chest radiographs are compared to chest x-ray and chest

CT dated 9/17/2017. The cardiomediastinal silhouette is unremarkable.  The lungs

and pleural spaces are clear. There is no pneumothorax. The bony thorax appears

intact.



IMPRESSION: No active disease in the chest.







Electronically signed by:  Darshan Montgomery M.D.

3/9/2018 10:32 AM



Dictated Date/Time:  3/9/2018 10:31 AM

## 2023-05-02 NOTE — PULMONOLOGY PROGRESS NOTE
Pulmonary Progress Note


Date of Service


Sep 21, 2017.





Attending


Dr. Caceres





Subjective


Patient is feeling much improved today. She states that she is breathing well, 

and she is experiencing very little SOB. She is s/p EBUS with Transbronchial 

bx. Lymph node pathology along with BAL path and cultures are pending. The 

patient is anticipating D/C this afternoon. She continues on Azithromycin and 

Ceftriaxone. 





Labs reviewed 9/21:


Creatinine 0.74


BUN 14


Calcium 9.0


Electrolytes stable. 





VS reviewed:


Afebrile


HR 56-67


/80


SaO2 96% on room air





Objective


General: Patient is awake, alert, cooperative, and in no acute distress. Well 

developed.  Well-nourished.


Head: Normocephalic, Atraumatic.


ENT: PERRLA, No discharge, EOMI, Sclera normal


Neck: Normal ROM. Trachea midline. No stridor


Respiratory: One or 2 crackles in the MIKE, but otherwise clear. Normal breath 

sounds. No respiratory distress. No accessory muscle use.


Cardiovascular: Regular rate and rhythm. No murmur appreciate. Normal S1/S2. 


Abdomen: Normal bowel sounds hear throughout.


Back: Normal inspection. 


Extremities: No edema, cyanosis. Normal ROM


Neuro: Alert, Oriented x 3. CN II-XII grossly intact. Sensation and motor 

function grossly intact.


Psych: Mood and affect are normal.





Assessment & Plan


Progressive pulmonary infiltrates with mediastinal lymphadenopathy


-Patient is overall improved. Her breathing is much easier, and she is 

anticipating D/C this afternoon.


-Will continue workup as an outpatient and review pathology reports/cultures/

labs as outpatient





Dyspnea/Hypoxia- resolved, patient is saturating well on room air currently





Pneumonia- completed 5 days of Zithromax and Ceftriaxone, continues prednisone 

taper





She will need follow up with [facility] [place] 1-2 weeks as an outpatient. Pulm 

will sign off. Thank you





Patient seen examined and agree with above plan.





Data


Medications:





Current Inpatient Medications








 Medications


  (Trade)  Dose


 Ordered  Sig/Anibal


 Route  Start Time


 Stop Time Status Last Admin


Dose Admin


 


 Ceftriaxone


 Sodium 1 gm/


 Dextrose  50 ml @ 


 100 mls/hr  Q24H


 IV  9/18/17 10:00


 9/24/17 09:59  9/21/17 10:22


100 MLS/HR


 


 Docusate Sodium


  (coLACE CAP)  100 mg  DAILY  PRN


 PO  9/17/17 12:45


 10/17/17 12:44   


 


 


 Gabapentin


  (Neurontin Cap)  300 mg  DAILY@1400


 PO  9/18/17 14:00


 10/18/17 13:59  9/19/17 12:39


300 MG


 


 Gabapentin


  (Neurontin Cap)  300 mg  QAM


 PO  9/18/17 09:00


 10/18/17 08:59  9/21/17 08:23


300 MG


 


 Gabapentin


  (Neurontin Cap)  600 mg  HS


 PO  9/17/17 21:00


 10/17/17 20:59  9/20/17 20:45


600 MG


 


 Venlafaxine HCl


  (effeXOR


 EXTENDED REL CAP)  75 mg  DAILY


 PO  9/18/17 09:00


 10/18/17 08:59  9/21/17 08:24


75 MG


 


 Venlafaxine HCl


  (effeXOR


 EXTENDED REL CAP)  150 mg  DAILY


 PO  9/18/17 09:00


 10/18/17 08:59  9/21/17 08:23


150 MG


 


 Methadone HCl


  (Methadone HCl)  149 mg  QAM


 PO  9/18/17 09:00


 10/2/17 08:59 Future hold 9/21/17 08:41


149 MG


 


 Clonazepam


  (Klonopin Tab)  1 mg  DAILY  PRN


 PO  9/17/17 12:45


 10/17/17 12:44  9/21/17 08:41


1 MG


 


 Ondansetron HCl


  (Zofran Inj)  4 mg  Q6H  PRN


 IV  9/17/17 12:45


 10/17/17 12:44   


 


 


 Non-Formulary


 Medication


  (Patient'S Own


 Controlled Med)  1 ea  QAM  PRN


 N/A  9/18/17 09:00


 10/2/17 08:59 Future hold 9/18/17 08:17


1 EA


 


 Lansoprazole


  (Prevacid


 Solutab)  15 mg  BID


 PO  9/17/17 21:00


 10/17/17 20:59  9/21/17 08:23


15 MG


 


 Ibuprofen


  (Motrin Tab)  600 mg  TID  PRN


 PO  9/17/17 18:45


 10/17/17 18:44  9/19/17 19:43


600 MG


 


 Codeine Phosphate/


 Guaifenesin


  (Robitussin-AC


 Sugar Free Syrup)  10 ml  Q6H  PRN


 PO  9/18/17 19:45


 10/18/17 19:44  9/20/17 18:53


10 ML


 


 Ketorolac


 Tromethamine


  (Toradol Inj)  30 mg  Q6H  PRN


 IV  9/18/17 20:45


 9/23/17 20:44  9/20/17 18:54


30 MG


 


 Albuterol/


 Ipratropium


  (Combivent


 Respimat Inh)  1 puffs  QID


 INH  9/19/17 13:00


 10/19/17 12:59  9/21/17 12:57


1 PUFFS


 


 Azithromycin


  (Zithromax Tab)  500 mg  DAILY


 PO  9/20/17 09:00


 9/24/17 08:59  9/21/17 08:25


500 MG


 


 Prednisone


  (PredniSONE TAB)  20 mg  DAILY


 PO  9/21/17 09:00


 10/18/17 08:59  9/21/17 08:22


20 MG


 


 Insulin Aspart


  (novoLOG ASPART)  **SLIDING


 SCALE**


 **If C...  ACHS


 SC  9/20/17 21:00


 10/20/17 20:59  9/21/17 12:49


2 UNITS


 


 Glucose


  (Glucose 40% Gel)  15-30


 GRAMS 15


 GRAMS...  UD  PRN


 PO  9/20/17 18:00


 10/20/17 17:59   


 


 


 Glucose


  (Glucose Chew


 Tab)  4-8


 Tablets 4


 Tabl...  UD  PRN


 PO  9/20/17 18:00


 10/20/17 17:59   


 


 


 Dextrose


  (Dextrose 50%


 50ML Syringe)  25-50ML OF


 50% DW IV


 FOR...  UD  PRN


 IV  9/20/17 18:00


 10/20/17 17:59   


 


 


 Glucagon


  (Glucagon Inj)  1 mg  UD  PRN


 SQ  9/20/17 18:00


 10/20/17 17:59   


 








I & O:











24-Hour Column 


 


 9/22/17





 07:59


 


Intake Total 240 ml


 


Balance 240 ml








Vital Signs:











  Date Time  Temp Pulse Resp B/P (MAP) Pulse Ox O2 Delivery O2 Flow Rate FiO2


 


9/21/17 11:07      Room Air  


 


9/21/17 07:18 36.8 67 18 128/80 (96) 96 Room Air  


 


9/21/17 00:00      Room Air  


 


9/20/17 23:05 36.7 56 18 120/70 (87) 96 Nasal Cannula 2.0 


 


9/20/17 16:30 37.0 64 18 115/71 (86) 93 Room Air 10.0 


 


9/20/17 16:22 37.0 64 18 115/71 (86) 93 Room Air  


 


9/20/17 16:00     92 Room Air  


 


9/20/17 15:28 36.7 63 20 130/78 (95)  Room Air  


 


9/20/17 15:00 36.5 63 20 134/80 92 Room Air  


 


9/20/17 14:50  67 20 129/81 97 Oxymask 10 


 


9/20/17 14:40  70 20 104/71 97 Oxymask 10 


 


9/20/17 14:30 36.4 92 20 120/87 98 Oxymask 10 








Laboratory Results:





Last 24 Hours








Test


  9/20/17


14:52 9/20/17


17:31 9/20/17


19:40 9/20/17


20:03


 


Bedside Glucose 162 mg/dl  207 mg/dl   160 mg/dl 


 


Urine Collection Time   24 HOURS  


 


Urine Total Volume   2800 mL  


 


Urine Calcium mg%   < 5.0 mg/dl  


 


Urine Calcium 24 Hour


  


  


  < 140.0 mg/24


HR 


 


 


Test


  9/21/17


06:50 9/21/17


07:29 9/21/17


11:07 


 


 


Sodium Level 137 mmol/L    


 


Potassium Level 3.7 mmol/L    


 


Chloride Level 103 mmol/L    


 


Carbon Dioxide Level 27 mmol/L    


 


Anion Gap 7.0 mmol/L    


 


Blood Urea Nitrogen 14 mg/dl    


 


Creatinine 0.74 mg/dl    


 


Est Creatinine Clear Calc


Drug Dose 115.3 ml/min 


  


  


  


 


 


Estimated GFR (


American) 112.6 


  


  


  


 


 


Estimated GFR (Non-


American 97.2 


  


  


  


 


 


BUN/Creatinine Ratio 18.9    


 


Random Glucose 103 mg/dl    


 


Calcium Level 9.0 mg/dl    


 


Bedside Glucose  121 mg/dl  137 mg/dl 08-May-2023 08-May-2023